# Patient Record
Sex: FEMALE | Employment: UNEMPLOYED | URBAN - METROPOLITAN AREA
[De-identification: names, ages, dates, MRNs, and addresses within clinical notes are randomized per-mention and may not be internally consistent; named-entity substitution may affect disease eponyms.]

---

## 2018-10-09 ENCOUNTER — HOSPITAL ENCOUNTER (OUTPATIENT)
Age: 68
Discharge: HOME | End: 2018-10-11

## 2018-10-09 PROCEDURE — 88305 TISSUE EXAM BY PATHOLOGIST: CPT

## 2018-10-09 PROCEDURE — 88342 IMHCHEM/IMCYTCHM 1ST ANTB: CPT

## 2020-12-24 ENCOUNTER — APPOINTMENT (OUTPATIENT)
Dept: CT IMAGING | Age: 70
End: 2020-12-24
Payer: MEDICARE

## 2020-12-24 ENCOUNTER — HOSPITAL ENCOUNTER (EMERGENCY)
Age: 70
Discharge: HOME | End: 2020-12-24
Attending: EMERGENCY MEDICINE
Payer: MEDICARE

## 2020-12-24 ENCOUNTER — APPOINTMENT (OUTPATIENT)
Dept: GENERAL RADIOLOGY | Age: 70
End: 2020-12-24
Payer: MEDICARE

## 2020-12-24 VITALS
SYSTOLIC BLOOD PRESSURE: 161 MMHG | DIASTOLIC BLOOD PRESSURE: 73 MMHG | OXYGEN SATURATION: 97 % | HEART RATE: 98 BPM | TEMPERATURE: 97.3 F | RESPIRATION RATE: 20 BRPM

## 2020-12-24 LAB
ALBUMIN SERPL-MCNC: 3.9 G/DL (ref 3.5–5.2)
ALP BLD-CCNC: 77 U/L (ref 35–104)
ALT SERPL-CCNC: 64 U/L (ref 0–32)
ANION GAP SERPL CALCULATED.3IONS-SCNC: 14 MMOL/L (ref 7–16)
APTT: 23.5 SEC (ref 24.5–35.1)
AST SERPL-CCNC: 78 U/L (ref 0–31)
BACTERIA: ABNORMAL /HPF
BASOPHILS ABSOLUTE: 0.02 E9/L (ref 0–0.2)
BASOPHILS RELATIVE PERCENT: 0.3 % (ref 0–2)
BILIRUB SERPL-MCNC: 0.2 MG/DL (ref 0–1.2)
BILIRUBIN URINE: NEGATIVE
BLOOD, URINE: NEGATIVE
BUN BLDV-MCNC: 11 MG/DL (ref 8–23)
CALCIUM SERPL-MCNC: 9.4 MG/DL (ref 8.6–10.2)
CHLORIDE BLD-SCNC: 95 MMOL/L (ref 98–107)
CHP ED QC CHECK: YES
CLARITY: CLEAR
CO2: 21 MMOL/L (ref 22–29)
COLOR: YELLOW
CREAT SERPL-MCNC: 1 MG/DL (ref 0.5–1)
EOSINOPHILS ABSOLUTE: 0.1 E9/L (ref 0.05–0.5)
EOSINOPHILS RELATIVE PERCENT: 1.4 % (ref 0–6)
GFR AFRICAN AMERICAN: >60
GFR NON-AFRICAN AMERICAN: 55 ML/MIN/1.73
GLUCOSE BLD-MCNC: 282 MG/DL
GLUCOSE BLD-MCNC: 288 MG/DL (ref 74–99)
GLUCOSE URINE: >=1000 MG/DL
HCT VFR BLD CALC: 33.6 % (ref 34–48)
HEMOGLOBIN: 10.9 G/DL (ref 11.5–15.5)
IMMATURE GRANULOCYTES #: 0.02 E9/L
IMMATURE GRANULOCYTES %: 0.3 % (ref 0–5)
INR BLD: 1.1
KETONES, URINE: 15 MG/DL
LEUKOCYTE ESTERASE, URINE: NEGATIVE
LYMPHOCYTES ABSOLUTE: 1.64 E9/L (ref 1.5–4)
LYMPHOCYTES RELATIVE PERCENT: 22.5 % (ref 20–42)
MCH RBC QN AUTO: 27.4 PG (ref 26–35)
MCHC RBC AUTO-ENTMCNC: 32.4 % (ref 32–34.5)
MCV RBC AUTO: 84.4 FL (ref 80–99.9)
METER GLUCOSE: 282 MG/DL (ref 74–99)
MONOCYTES ABSOLUTE: 0.52 E9/L (ref 0.1–0.95)
MONOCYTES RELATIVE PERCENT: 7.1 % (ref 2–12)
NEUTROPHILS ABSOLUTE: 5 E9/L (ref 1.8–7.3)
NEUTROPHILS RELATIVE PERCENT: 68.4 % (ref 43–80)
NITRITE, URINE: POSITIVE
PDW BLD-RTO: 13 FL (ref 11.5–15)
PH UA: 5.5 (ref 5–9)
PLATELET # BLD: 228 E9/L (ref 130–450)
PMV BLD AUTO: 10.4 FL (ref 7–12)
POTASSIUM SERPL-SCNC: 4.3 MMOL/L (ref 3.5–5)
PROTEIN UA: 30 MG/DL
PROTHROMBIN TIME: 12.8 SEC (ref 9.3–12.4)
RBC # BLD: 3.98 E12/L (ref 3.5–5.5)
RBC UA: ABNORMAL /HPF (ref 0–2)
SODIUM BLD-SCNC: 130 MMOL/L (ref 132–146)
SPECIFIC GRAVITY UA: 1.02 (ref 1–1.03)
TOTAL PROTEIN: 7.3 G/DL (ref 6.4–8.3)
TROPONIN: <0.01 NG/ML (ref 0–0.03)
UROBILINOGEN, URINE: 0.2 E.U./DL
WBC # BLD: 7.3 E9/L (ref 4.5–11.5)
WBC UA: ABNORMAL /HPF (ref 0–5)

## 2020-12-24 PROCEDURE — 85025 COMPLETE CBC W/AUTO DIFF WBC: CPT

## 2020-12-24 PROCEDURE — 82962 GLUCOSE BLOOD TEST: CPT

## 2020-12-24 PROCEDURE — 85730 THROMBOPLASTIN TIME PARTIAL: CPT

## 2020-12-24 PROCEDURE — 99284 EMERGENCY DEPT VISIT MOD MDM: CPT

## 2020-12-24 PROCEDURE — 2580000003 HC RX 258: Performed by: EMERGENCY MEDICINE

## 2020-12-24 PROCEDURE — 81001 URINALYSIS AUTO W/SCOPE: CPT

## 2020-12-24 PROCEDURE — 80053 COMPREHEN METABOLIC PANEL: CPT

## 2020-12-24 PROCEDURE — 85610 PROTHROMBIN TIME: CPT

## 2020-12-24 PROCEDURE — 93005 ELECTROCARDIOGRAM TRACING: CPT | Performed by: EMERGENCY MEDICINE

## 2020-12-24 PROCEDURE — 71045 X-RAY EXAM CHEST 1 VIEW: CPT

## 2020-12-24 PROCEDURE — 70450 CT HEAD/BRAIN W/O DYE: CPT

## 2020-12-24 PROCEDURE — 84484 ASSAY OF TROPONIN QUANT: CPT

## 2020-12-24 RX ORDER — SODIUM CHLORIDE 9 MG/ML
INJECTION, SOLUTION INTRAVENOUS CONTINUOUS
Status: DISCONTINUED | OUTPATIENT
Start: 2020-12-24 | End: 2020-12-25 | Stop reason: HOSPADM

## 2020-12-24 RX ORDER — MECLIZINE HYDROCHLORIDE 25 MG/1
25 TABLET ORAL EVERY 8 HOURS PRN
COMMUNITY

## 2020-12-24 RX ADMIN — SODIUM CHLORIDE: 9 INJECTION, SOLUTION INTRAVENOUS at 18:24

## 2020-12-24 NOTE — ED NOTES
Bed: 09  Expected date:   Expected time:   Means of arrival:   Comments:  Adi Mata RN  12/24/20 3294

## 2020-12-25 LAB
EKG ATRIAL RATE: 109 BPM
EKG P AXIS: 81 DEGREES
EKG P-R INTERVAL: 190 MS
EKG Q-T INTERVAL: 340 MS
EKG QRS DURATION: 76 MS
EKG QTC CALCULATION (BAZETT): 457 MS
EKG R AXIS: 95 DEGREES
EKG T AXIS: 81 DEGREES
EKG VENTRICULAR RATE: 109 BPM

## 2020-12-25 PROCEDURE — 93010 ELECTROCARDIOGRAM REPORT: CPT | Performed by: INTERNAL MEDICINE
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
## 2021-02-20 ENCOUNTER — IMMUNIZATION (OUTPATIENT)
Dept: PRIMARY CARE CLINIC | Age: 71
End: 2021-02-20
Payer: MEDICARE

## 2021-02-20 PROCEDURE — 91300 COVID-19, PFIZER VACCINE 30MCG/0.3ML DOSE: CPT | Performed by: NURSE PRACTITIONER

## 2021-02-20 PROCEDURE — 0001A COVID-19, PFIZER VACCINE 30MCG/0.3ML DOSE: CPT | Performed by: NURSE PRACTITIONER

## 2021-03-11 ENCOUNTER — IMMUNIZATION (OUTPATIENT)
Dept: PRIMARY CARE CLINIC | Age: 71
End: 2021-03-11
Payer: MEDICARE

## 2021-03-11 PROCEDURE — 91300 COVID-19, PFIZER VACCINE 30MCG/0.3ML DOSE: CPT | Performed by: NURSE PRACTITIONER

## 2021-03-11 PROCEDURE — 0002A COVID-19, PFIZER VACCINE 30MCG/0.3ML DOSE: CPT | Performed by: NURSE PRACTITIONER

## 2021-06-29 ENCOUNTER — APPOINTMENT (OUTPATIENT)
Dept: GENERAL RADIOLOGY | Age: 71
End: 2021-06-29
Payer: MEDICARE

## 2021-06-29 ENCOUNTER — APPOINTMENT (OUTPATIENT)
Dept: CT IMAGING | Age: 71
End: 2021-06-29
Payer: MEDICARE

## 2021-06-29 ENCOUNTER — HOSPITAL ENCOUNTER (OUTPATIENT)
Age: 71
Setting detail: OBSERVATION
Discharge: HOME | End: 2021-06-30
Attending: EMERGENCY MEDICINE | Admitting: INTERNAL MEDICINE
Payer: MEDICARE

## 2021-06-29 DIAGNOSIS — I10 ESSENTIAL HYPERTENSION: ICD-10-CM

## 2021-06-29 DIAGNOSIS — R27.0 ATAXIA: ICD-10-CM

## 2021-06-29 DIAGNOSIS — R29.90 STROKE-LIKE SYMPTOM: Primary | ICD-10-CM

## 2021-06-29 LAB
ALBUMIN SERPL-MCNC: 4.4 G/DL (ref 3.5–5.2)
ALP BLD-CCNC: 83 U/L (ref 35–104)
ALT SERPL-CCNC: 49 U/L (ref 0–32)
ANION GAP SERPL CALCULATED.3IONS-SCNC: 12 MMOL/L (ref 7–16)
APTT: 34.5 SEC (ref 24.5–35.1)
AST SERPL-CCNC: 60 U/L (ref 0–31)
BASOPHILS ABSOLUTE: 0.03 E9/L (ref 0–0.2)
BASOPHILS RELATIVE PERCENT: 0.4 % (ref 0–2)
BILIRUB SERPL-MCNC: 0.3 MG/DL (ref 0–1.2)
BILIRUBIN URINE: NEGATIVE
BLOOD, URINE: NEGATIVE
BUN BLDV-MCNC: 8 MG/DL (ref 6–23)
CALCIUM SERPL-MCNC: 10 MG/DL (ref 8.6–10.2)
CHLORIDE BLD-SCNC: 97 MMOL/L (ref 98–107)
CHP ED QC CHECK: NORMAL
CLARITY: CLEAR
CO2: 26 MMOL/L (ref 22–29)
COLOR: YELLOW
CREAT SERPL-MCNC: 0.5 MG/DL (ref 0.5–1)
EOSINOPHILS ABSOLUTE: 0.1 E9/L (ref 0.05–0.5)
EOSINOPHILS RELATIVE PERCENT: 1.2 % (ref 0–6)
GFR AFRICAN AMERICAN: >60
GFR NON-AFRICAN AMERICAN: >60 ML/MIN/1.73
GLUCOSE BLD-MCNC: 100 MG/DL
GLUCOSE BLD-MCNC: 104 MG/DL (ref 74–99)
GLUCOSE URINE: NEGATIVE MG/DL
HCT VFR BLD CALC: 33.1 % (ref 34–48)
HEMOGLOBIN: 10.3 G/DL (ref 11.5–15.5)
IMMATURE GRANULOCYTES #: 0.03 E9/L
IMMATURE GRANULOCYTES %: 0.4 % (ref 0–5)
INR BLD: 1.1
KETONES, URINE: NEGATIVE MG/DL
LEUKOCYTE ESTERASE, URINE: NEGATIVE
LYMPHOCYTES ABSOLUTE: 2.56 E9/L (ref 1.5–4)
LYMPHOCYTES RELATIVE PERCENT: 31.3 % (ref 20–42)
MCH RBC QN AUTO: 24.3 PG (ref 26–35)
MCHC RBC AUTO-ENTMCNC: 31.1 % (ref 32–34.5)
MCV RBC AUTO: 78.1 FL (ref 80–99.9)
METER GLUCOSE: 100 MG/DL (ref 74–99)
METER GLUCOSE: 119 MG/DL (ref 74–99)
METER GLUCOSE: 244 MG/DL (ref 74–99)
MONOCYTES ABSOLUTE: 0.55 E9/L (ref 0.1–0.95)
MONOCYTES RELATIVE PERCENT: 6.7 % (ref 2–12)
NEUTROPHILS ABSOLUTE: 4.92 E9/L (ref 1.8–7.3)
NEUTROPHILS RELATIVE PERCENT: 60 % (ref 43–80)
NITRITE, URINE: NEGATIVE
PDW BLD-RTO: 14.9 FL (ref 11.5–15)
PH UA: 7.5 (ref 5–9)
PLATELET # BLD: 342 E9/L (ref 130–450)
PMV BLD AUTO: 10.4 FL (ref 7–12)
POTASSIUM REFLEX MAGNESIUM: 4.2 MMOL/L (ref 3.5–5)
PROTEIN UA: NEGATIVE MG/DL
PROTHROMBIN TIME: 11.4 SEC (ref 9.3–12.4)
RBC # BLD: 4.24 E12/L (ref 3.5–5.5)
SODIUM BLD-SCNC: 135 MMOL/L (ref 132–146)
SPECIFIC GRAVITY UA: <=1.005 (ref 1–1.03)
TOTAL PROTEIN: 8.6 G/DL (ref 6.4–8.3)
TROPONIN, HIGH SENSITIVITY: <6 NG/L (ref 0–9)
UROBILINOGEN, URINE: 0.2 E.U./DL
WBC # BLD: 8.2 E9/L (ref 4.5–11.5)

## 2021-06-29 PROCEDURE — 6360000002 HC RX W HCPCS: Performed by: EMERGENCY MEDICINE

## 2021-06-29 PROCEDURE — 70496 CT ANGIOGRAPHY HEAD: CPT

## 2021-06-29 PROCEDURE — 96372 THER/PROPH/DIAG INJ SC/IM: CPT

## 2021-06-29 PROCEDURE — 80053 COMPREHEN METABOLIC PANEL: CPT

## 2021-06-29 PROCEDURE — G0378 HOSPITAL OBSERVATION PER HR: HCPCS

## 2021-06-29 PROCEDURE — 6360000002 HC RX W HCPCS: Performed by: INTERNAL MEDICINE

## 2021-06-29 PROCEDURE — 2580000003 HC RX 258: Performed by: INTERNAL MEDICINE

## 2021-06-29 PROCEDURE — 96374 THER/PROPH/DIAG INJ IV PUSH: CPT

## 2021-06-29 PROCEDURE — 93005 ELECTROCARDIOGRAM TRACING: CPT | Performed by: EMERGENCY MEDICINE

## 2021-06-29 PROCEDURE — 82962 GLUCOSE BLOOD TEST: CPT

## 2021-06-29 PROCEDURE — 70498 CT ANGIOGRAPHY NECK: CPT

## 2021-06-29 PROCEDURE — 71045 X-RAY EXAM CHEST 1 VIEW: CPT

## 2021-06-29 PROCEDURE — 0042T CT BRAIN PERFUSION: CPT

## 2021-06-29 PROCEDURE — 84484 ASSAY OF TROPONIN QUANT: CPT

## 2021-06-29 PROCEDURE — 70450 CT HEAD/BRAIN W/O DYE: CPT

## 2021-06-29 PROCEDURE — 85025 COMPLETE CBC W/AUTO DIFF WBC: CPT

## 2021-06-29 PROCEDURE — 99284 EMERGENCY DEPT VISIT MOD MDM: CPT

## 2021-06-29 PROCEDURE — 6370000000 HC RX 637 (ALT 250 FOR IP): Performed by: INTERNAL MEDICINE

## 2021-06-29 PROCEDURE — 6360000004 HC RX CONTRAST MEDICATION: Performed by: RADIOLOGY

## 2021-06-29 PROCEDURE — 85730 THROMBOPLASTIN TIME PARTIAL: CPT

## 2021-06-29 PROCEDURE — 85610 PROTHROMBIN TIME: CPT

## 2021-06-29 PROCEDURE — 6370000000 HC RX 637 (ALT 250 FOR IP): Performed by: EMERGENCY MEDICINE

## 2021-06-29 PROCEDURE — 2580000003 HC RX 258: Performed by: EMERGENCY MEDICINE

## 2021-06-29 PROCEDURE — 81003 URINALYSIS AUTO W/O SCOPE: CPT

## 2021-06-29 RX ORDER — ASPIRIN 81 MG/1
81 TABLET ORAL DAILY
COMMUNITY

## 2021-06-29 RX ORDER — MECLIZINE HCL 12.5 MG/1
25 TABLET ORAL ONCE
Status: COMPLETED | OUTPATIENT
Start: 2021-06-29 | End: 2021-06-29

## 2021-06-29 RX ORDER — SODIUM CHLORIDE 9 MG/ML
25 INJECTION, SOLUTION INTRAVENOUS PRN
Status: DISCONTINUED | OUTPATIENT
Start: 2021-06-29 | End: 2021-06-30 | Stop reason: HOSPADM

## 2021-06-29 RX ORDER — 0.9 % SODIUM CHLORIDE 0.9 %
1000 INTRAVENOUS SOLUTION INTRAVENOUS ONCE
Status: COMPLETED | OUTPATIENT
Start: 2021-06-29 | End: 2021-06-29

## 2021-06-29 RX ORDER — SODIUM CHLORIDE 0.9 % (FLUSH) 0.9 %
5-40 SYRINGE (ML) INJECTION PRN
Status: DISCONTINUED | OUTPATIENT
Start: 2021-06-29 | End: 2021-06-30 | Stop reason: HOSPADM

## 2021-06-29 RX ORDER — ASPIRIN 300 MG/1
300 SUPPOSITORY RECTAL DAILY
Status: DISCONTINUED | OUTPATIENT
Start: 2021-06-29 | End: 2021-06-30 | Stop reason: HOSPADM

## 2021-06-29 RX ORDER — DEXTROSE MONOHYDRATE 25 G/50ML
12.5 INJECTION, SOLUTION INTRAVENOUS PRN
Status: DISCONTINUED | OUTPATIENT
Start: 2021-06-29 | End: 2021-06-30 | Stop reason: HOSPADM

## 2021-06-29 RX ORDER — OMEPRAZOLE 20 MG/1
20 CAPSULE, DELAYED RELEASE ORAL
COMMUNITY

## 2021-06-29 RX ORDER — NICOTINE POLACRILEX 4 MG
15 LOZENGE BUCCAL PRN
Status: DISCONTINUED | OUTPATIENT
Start: 2021-06-29 | End: 2021-06-30 | Stop reason: HOSPADM

## 2021-06-29 RX ORDER — POLYETHYLENE GLYCOL 3350 17 G/17G
17 POWDER, FOR SOLUTION ORAL DAILY PRN
Status: DISCONTINUED | OUTPATIENT
Start: 2021-06-29 | End: 2021-06-30 | Stop reason: HOSPADM

## 2021-06-29 RX ORDER — ASPIRIN 81 MG/1
81 TABLET ORAL DAILY
Status: DISCONTINUED | OUTPATIENT
Start: 2021-06-29 | End: 2021-06-30 | Stop reason: HOSPADM

## 2021-06-29 RX ORDER — SODIUM CHLORIDE 9 MG/ML
INJECTION, SOLUTION INTRAVENOUS CONTINUOUS
Status: DISCONTINUED | OUTPATIENT
Start: 2021-06-29 | End: 2021-06-30 | Stop reason: HOSPADM

## 2021-06-29 RX ORDER — DEXTROSE MONOHYDRATE 50 MG/ML
100 INJECTION, SOLUTION INTRAVENOUS PRN
Status: DISCONTINUED | OUTPATIENT
Start: 2021-06-29 | End: 2021-06-30 | Stop reason: HOSPADM

## 2021-06-29 RX ORDER — SODIUM CHLORIDE 0.9 % (FLUSH) 0.9 %
5-40 SYRINGE (ML) INJECTION EVERY 12 HOURS SCHEDULED
Status: DISCONTINUED | OUTPATIENT
Start: 2021-06-29 | End: 2021-06-30 | Stop reason: HOSPADM

## 2021-06-29 RX ORDER — ONDANSETRON 2 MG/ML
4 INJECTION INTRAMUSCULAR; INTRAVENOUS EVERY 6 HOURS PRN
Status: DISCONTINUED | OUTPATIENT
Start: 2021-06-29 | End: 2021-06-30 | Stop reason: HOSPADM

## 2021-06-29 RX ORDER — CETIRIZINE HYDROCHLORIDE 10 MG/1
10 TABLET ORAL DAILY PRN
COMMUNITY

## 2021-06-29 RX ORDER — ATORVASTATIN CALCIUM 40 MG/1
40 TABLET, FILM COATED ORAL NIGHTLY
Status: DISCONTINUED | OUTPATIENT
Start: 2021-06-29 | End: 2021-06-30 | Stop reason: HOSPADM

## 2021-06-29 RX ORDER — CALCIUM CARBONATE 500(1250)
500 TABLET ORAL DAILY
COMMUNITY

## 2021-06-29 RX ORDER — ONDANSETRON 4 MG/1
4 TABLET, ORALLY DISINTEGRATING ORAL EVERY 8 HOURS PRN
Status: DISCONTINUED | OUTPATIENT
Start: 2021-06-29 | End: 2021-06-30 | Stop reason: HOSPADM

## 2021-06-29 RX ORDER — ASPIRIN 81 MG/1
243 TABLET, CHEWABLE ORAL ONCE
Status: COMPLETED | OUTPATIENT
Start: 2021-06-29 | End: 2021-06-29

## 2021-06-29 RX ORDER — ONDANSETRON 2 MG/ML
4 INJECTION INTRAMUSCULAR; INTRAVENOUS ONCE
Status: COMPLETED | OUTPATIENT
Start: 2021-06-29 | End: 2021-06-29

## 2021-06-29 RX ORDER — PANTOPRAZOLE SODIUM 40 MG/1
40 TABLET, DELAYED RELEASE ORAL
Status: DISCONTINUED | OUTPATIENT
Start: 2021-06-30 | End: 2021-06-30 | Stop reason: HOSPADM

## 2021-06-29 RX ORDER — CALCIUM CARBONATE 500(1250)
500 TABLET ORAL DAILY
Status: DISCONTINUED | OUTPATIENT
Start: 2021-06-29 | End: 2021-06-30 | Stop reason: HOSPADM

## 2021-06-29 RX ADMIN — ATORVASTATIN CALCIUM 40 MG: 40 TABLET, FILM COATED ORAL at 19:50

## 2021-06-29 RX ADMIN — ENOXAPARIN SODIUM 40 MG: 40 INJECTION SUBCUTANEOUS at 18:15

## 2021-06-29 RX ADMIN — SODIUM CHLORIDE, PRESERVATIVE FREE 10 ML: 5 INJECTION INTRAVENOUS at 21:41

## 2021-06-29 RX ADMIN — IOPAMIDOL 100 ML: 755 INJECTION, SOLUTION INTRAVENOUS at 14:27

## 2021-06-29 RX ADMIN — SODIUM CHLORIDE: 9 INJECTION, SOLUTION INTRAVENOUS at 18:13

## 2021-06-29 RX ADMIN — ASPIRIN 243 MG: 81 TABLET, CHEWABLE ORAL at 16:05

## 2021-06-29 RX ADMIN — ONDANSETRON 4 MG: 2 INJECTION INTRAMUSCULAR; INTRAVENOUS at 14:43

## 2021-06-29 RX ADMIN — SODIUM CHLORIDE, PRESERVATIVE FREE 10 ML: 5 INJECTION INTRAVENOUS at 19:50

## 2021-06-29 RX ADMIN — SODIUM CHLORIDE 1000 ML: 9 INJECTION, SOLUTION INTRAVENOUS at 15:28

## 2021-06-29 RX ADMIN — ASPIRIN 81 MG: 81 TABLET, COATED ORAL at 18:13

## 2021-06-29 RX ADMIN — MECLIZINE HCL 12.5 MG 25 MG: 12.5 TABLET ORAL at 15:27

## 2021-06-29 RX ADMIN — INSULIN LISPRO 1 UNITS: 100 INJECTION, SOLUTION INTRAVENOUS; SUBCUTANEOUS at 19:50

## 2021-06-29 ASSESSMENT — PAIN SCALES - GENERAL
PAINLEVEL_OUTOF10: 0
PAINLEVEL_OUTOF10: 6
PAINLEVEL_OUTOF10: 5

## 2021-06-29 ASSESSMENT — PAIN DESCRIPTION - DESCRIPTORS: DESCRIPTORS: SHARP

## 2021-06-29 ASSESSMENT — ENCOUNTER SYMPTOMS
SHORTNESS OF BREATH: 0
DIARRHEA: 0
BACK PAIN: 0
VOMITING: 0
EYE PAIN: 0
SORE THROAT: 0
COUGH: 0
NAUSEA: 1
ABDOMINAL PAIN: 1

## 2021-06-29 ASSESSMENT — PAIN DESCRIPTION - PAIN TYPE: TYPE: ACUTE PAIN

## 2021-06-29 NOTE — ED NOTES
Bed: 20  Expected date:   Expected time:   Means of arrival:   Comments:  Theodore Rodriguez, [facility]  06/29/21 3444

## 2021-06-29 NOTE — ED NOTES
[facility] faxed. Cortney at ext [phone] confirmed receipt.  Patient prepared to be transported via cart and monitor     Indy Flanagan RN  06/29/21 [id number]

## 2021-06-29 NOTE — ED PROVIDER NOTES
HPI   Patient is a 69 y.o. female with a past medical history of diabetes, seizures, presenting to the Emergency Department for arm pain, leg pain and dizziness. History obtained by patient. Symptoms are moderate to severe in severity and persistent since onset. They are improved by nothing and worsened by movement. Patient states yesterday she started with sharp stabbing arm pain that was her entire right upper extremity. She went to bed to get better. She woke up today the arm pain had improved but was still there. She states other than the arm pain she was her normal self. Around 8 AM she was cooking in the kitchen had sudden onset of nausea as well as lightheadedness. She states she felt very diaphoretic and thought she was going to pass out. She also felt like her balance was very off. Nothing like this is ever happened before. She called her daughter and she came over to evaluate her and patient states she had to come to the ER for evaluation. Denies any history of stroke. No recent trauma. Denies blurry vision, changes in vision, chest pain, shortness of breath. States she does have chronic abdominal pain is not new or worsening. Denies diarrhea or constipation she has no numbness or tingling. She denies any weakness. She does states she has chronic left lower extremity pain that has been ongoing for many months. She denies any bowel or bladder incontinence. No saddle paresthesia    Review of Systems   Constitutional: Positive for diaphoresis. Negative for chills and fever. HENT: Negative for congestion and sore throat. Eyes: Negative for pain and visual disturbance. Respiratory: Negative for cough and shortness of breath. Cardiovascular: Negative for chest pain. Gastrointestinal: Positive for abdominal pain and nausea. Negative for diarrhea and vomiting. Genitourinary: Negative for dysuria and frequency. Musculoskeletal: Negative for arthralgias and back pain.    Skin: Negative for rash and wound. Neurological: Positive for dizziness and light-headedness. Negative for weakness, numbness and headaches. All other systems reviewed and are negative. Physical Exam  Vitals and nursing note reviewed. Constitutional:       General: She is not in acute distress. Appearance: She is well-developed. She is not ill-appearing. HENT:      Head: Normocephalic and atraumatic. Right Ear: Tympanic membrane normal.      Ears:      Comments: Left tm old perf, no evidence of infection  Eyes:      Extraocular Movements: Extraocular movements intact. Pupils: Pupils are equal, round, and reactive to light. Cardiovascular:      Rate and Rhythm: Regular rhythm. Tachycardia present. Pulses: Normal pulses. Pulmonary:      Effort: Pulmonary effort is normal. No respiratory distress. Breath sounds: Normal breath sounds. No wheezing or rales. Abdominal:      Palpations: Abdomen is soft. Tenderness: There is no abdominal tenderness. There is no guarding or rebound. Musculoskeletal:      Cervical back: Normal range of motion and neck supple. No tenderness. Skin:     General: Skin is warm and dry. Capillary Refill: Capillary refill takes less than 2 seconds. Neurological:      Mental Status: She is alert and oriented to person, place, and time. Cranial Nerves: No cranial nerve deficit. Coordination: Coordination abnormal.      Gait: Gait abnormal.      Comments: NIH stroke scale of 4. Left-sided ataxia. Positive Romberg. Ataxic with ambulation. Left-sided pronator drift        NIH Stroke Scale/Score at time of initial evaluation:  1A: Level of Consciousness 0 - alert; keenly responsive   1B: Ask Month and Age 0 - answers both questions correctly   1C:  Tell Patient To Open and Close Eyes, then Hand  Squeeze 0 - performs both tasks correctly   2: Test Horizontal Extraocular Movements 0 - normal   3: Test Visual Fields 0 - no visual loss 4: Test Facial Palsy 0 - normal symmetric movement   5A: Test Left Arm Motor Drift 1 - drift, limb holds 90 (or 45) degrees but drifts down before full 10 seconds: does not hit bed   5B: Test Right Arm Motor Drift 0 - no drift, limb holds 90 (or 45) degrees for full 10 seconds   6A: Test Left Leg Motor Drift 1 - drift; leg falls by the end of the 5 second period but does not hit bed   6B: Test Right Leg Motor Drift 0 - no drift; leg holds 30 degree position for full 5 seconds   7: Test Limb Ataxia   (FNF/Heel-Shin) 2 - present in two limbs   8: Test Sensation 0 - normal; no sensory loss   9: Test Language/Aphasia 0 - no aphasia, normal   10: Test Dysarthria 0 - normal   11: Test Extinction/Inattention 0 - no abnormality   Total Score: 4   6/29/21 at 210pm.      Acute CVA Core Measures:      - t-PA Eligibility: IV t-PA was considered and not given due to violations in inclusion criteria including stroke onset was greater than 3 hours prior to presentation        Procedures     MDM   Patient presented to the Emergency Department for arm pain, leg pain, dizziness. They are clinically stable, hypertensive. Patient does have a left-sided ataxia and is ataxic with her gait. Concern for possible stroke. Last known well 8 AM, TPA considered but of the window for TPA. Abdominal alert was called. Patient's blood pressure improved on its own. Will allow permissive HTN for possible ischmeic stroke. Imaging obtained and essentially unremarkable. No evidence of large vessel occlusion, no thrombectomy candidate. Rest of work-up obtained. EKG with no acute ischemic changes, there is a prolonged QT which is identified after giving the patient meclizine. There is a mild transaminitis which is actually improved from baseline. No identifiable source of patient's symptoms at this time. 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 No evidence of infection.   Concern for strokelike symptoms. Patient benefit for inpatient evaluation and care. Consult was placed on-call hospitalist who accept the patient for admission. ED Course as of Jun 29 1611   Tue Jun 29, 2021   1413 NIH stroke scale of 4. LKW 8am    []      ED Course User Index  [] Stephanie Florence DO            ----------------------------------------------- PAST HISTORY --------------------------------------------  Past Medical History:  has a past medical history of Diabetes mellitus ([facility] [address] [facility] Past Surgical History:  has a past surgical history that includes Hysterectomy; Cholecystectomy; Tonsillectomy; and Cosmetic surgery. Social History:  reports that she has never smoked. She has never used smokeless tobacco. She reports that she does not drink alcohol and does not use drugs. Family History: family history is not on file. The patients home medications have been reviewed.     Allergies: Meperidine hcl    ------------------------------------------------ RESULTS ---------------------------------------------------    LABS:  Results for orders placed or performed during the hospital encounter of 06/29/21   CBC Auto Differential   Result Value Ref Range    WBC 8.2 4.5 - 11.5 E9/L    RBC 4.24 3.50 - 5.50 E12/L    Hemoglobin 10.3 (L) 11.5 - 15.5 g/dL    Hematocrit 33.1 (L) 34.0 - 48.0 %    MCV 78.1 (L) 80.0 - 99.9 fL    MCH 24.3 (L) 26.0 - 35.0 pg    MCHC 31.1 (L) 32.0 - 34.5 %    RDW 14.9 11.5 - 15.0 fL    Platelets 108 709 - 617 E9/L    MPV 10.4 7.0 - 12.0 fL    Neutrophils % 60.0 43.0 - 80.0 %    Immature Granulocytes % 0.4 0.0 - 5.0 %    Lymphocytes % 31.3 20.0 - 42.0 %    Monocytes % 6.7 2.0 - 12.0 %    Eosinophils % 1.2 0.0 - 6.0 %    Basophils % 0.4 0.0 - 2.0 %    Neutrophils Absolute 4.92 1.80 - 7.30 E9/L    Immature Granulocytes # 0.03 E9/L    Lymphocytes Absolute 2.56 1.50 - 4.00 E9/L    Monocytes Absolute 0.55 0.10 - 0.95 E9/L    Eosinophils Absolute 0.10 0.05 - 0.50 E9/L Basophils Absolute 0.03 0.00 - 0.20 E9/L   Troponin   Result Value Ref Range    Troponin, High Sensitivity <6 0 - 9 ng/L   Protime-INR   Result Value Ref Range    Protime 11.4 9.3 - 12.4 sec    INR 1.1    APTT   Result Value Ref Range    aPTT 34.5 24.5 - 35.1 sec   Comprehensive Metabolic Panel w/ Reflex to MG   Result Value Ref Range    Sodium 135 132 - 146 mmol/L    Potassium reflex Magnesium 4.2 3.5 - 5.0 mmol/L    Chloride 97 (L) 98 - 107 mmol/L    CO2 26 22 - 29 mmol/L    Anion Gap 12 7 - 16 mmol/L    Glucose 104 (H) 74 - 99 mg/dL    BUN 8 6 - 23 mg/dL    CREATININE 0.5 0.5 - 1.0 mg/dL    GFR Non-African American >60 >=60 mL/min/1.73    GFR African American >60     Calcium 10.0 8.6 - 10.2 mg/dL    Total Protein 8.6 (H) 6.4 - 8.3 g/dL    Albumin 4.4 3.5 - 5.2 g/dL    Total Bilirubin 0.3 0.0 - 1.2 mg/dL    Alkaline Phosphatase 83 35 - 104 U/L    ALT 49 (H) 0 - 32 U/L    AST 60 (H) 0 - 31 U/L   Urinalysis, reflex to microscopic   Result Value Ref Range    Color, UA Yellow Straw/Yellow    Clarity, UA Clear Clear    Glucose, Ur Negative Negative mg/dL    Bilirubin Urine Negative Negative    Ketones, Urine Negative Negative mg/dL    Specific Gravity, UA <=1.005 1.005 - 1.030    Blood, Urine Negative Negative    pH, UA 7.5 5.0 - 9.0    Protein, UA Negative Negative mg/dL    Urobilinogen, Urine 0.2 <2.0 E.U./dL    Nitrite, Urine Negative Negative    Leukocyte Esterase, Urine Negative Negative   POCT Glucose   Result Value Ref Range    Glucose 100 mg/dL    QC OK? ok    POCT Glucose   Result Value Ref Range    Meter Glucose 100 (H) 74 - 99 mg/dL   EKG 12 Lead   Result Value Ref Range    Ventricular Rate 84 BPM    Atrial Rate 84 BPM    P-R Interval 202 ms    QRS Duration 76 ms    Q-T Interval 416 ms    QTc Calculation (Bazett) 491 ms    P Axis 55 degrees    R Axis 37 degrees    T Axis 58 degrees       RADIOLOGY:    All Radiology results interpreted by Radiologist unless otherwise noted.   XR CHEST PORTABLE   Final Result No acute process. CTA HEAD W CONTRAST   Preliminary Result   No large vessel occlusion in the head or neck. No perfusion mismatch. CTA NECK W CONTRAST   Preliminary Result   No large vessel occlusion in the head or neck. No perfusion mismatch. CT BRAIN PERFUSION   Preliminary Result   No large vessel occlusion in the head or neck. No perfusion mismatch. CT HEAD WO CONTRAST   Final Result   No acute intracranial abnormality. Results were called by Dr. Jerrell Salvador to Dr. Venus Barfield on 6/29/2021 at 14:46. EKG:  This EKG is signed and interpreted by ED Physician. Time:  1542   Rate: 84  Rhythm: Sinus. Interpretation: no acute changes. Normal axis deviation. No acute ST segment elevation. QTc 491. QRS 76  Comparison: changes compared to previous EKG.    ---------------------------- NURSING NOTES AND VITALS REVIEWED -------------------------   The nursing notes within the ED encounter and vital signs as below have been reviewed. BP (!) 159/69   Pulse 86   Temp 98.6 °F (37 °C)   Resp 14   Ht 4' 11\" (1.499 m)   Wt 120 lb (54.4 kg)   SpO2 98%   BMI 24.24 kg/m²   Oxygen Saturation Interpretation: Normal      ------------------------------------------PROGRESS NOTES -------------------------------------------    ED COURSE MEDICATIONS:                Medications   0.9 % sodium chloride bolus (1,000 mLs Intravenous New Bag 6/29/21 1528)   iopamidol (ISOVUE-370) 76 % injection 100 mL (100 mLs Intravenous Given 6/29/21 1427)   ondansetron (ZOFRAN) injection 4 mg (4 mg Intravenous Given 6/29/21 1443)   meclizine (ANTIVERT) tablet 25 mg (25 mg Oral Given 6/29/21 1527)   aspirin chewable tablet 243 mg (243 mg Oral Given 6/29/21 1605)       CONSULTATIONS:            Consultation:  I Spoke with Dr. Gale Herrmann (Medicine). Discussed case. They will admit this patient. Gunner Richard PROCEDURES:            none.       COUNSELING:   I have spoken with the daughter and patient and discussed todays results, in addition to providing specific details for the plan of care and counseling regarding the diagnosis and prognosis.     --------------------------------------- IMPRESSION & DISPOSITION --------------------------------     IMPRESSION(s):  1. Stroke-like symptom    2. Ataxia    3. Essential hypertension        This patient's ED course included: a personal history and physicial examination, re-evaluation prior to disposition, cardiac monitoring and continuous pulse oximetry    This patient has been closely monitored during their ED course. DISPOSITION:  Disposition: Admit to telemetry. Patient condition is stable. END OF PROVIDER NOTE.             Isabella Choi DO  Resident  06/29/21 [id number]

## 2021-06-29 NOTE — PROGRESS NOTES
Notified Dr Jamison Montoya via telephone that pt has arrived to unit and that we need admission orders.

## 2021-06-29 NOTE — ED NOTES
Assumed care of patient. Introduced self to patient as RN. Patient complains of dizziness. Family at bedside call light within reach.      Cydney Kay RN  06/29/21 [id number]

## 2021-06-30 ENCOUNTER — APPOINTMENT (OUTPATIENT)
Dept: MRI IMAGING | Age: 71
End: 2021-06-30
Payer: MEDICARE

## 2021-06-30 VITALS
HEART RATE: 71 BPM | RESPIRATION RATE: 16 BRPM | OXYGEN SATURATION: 95 % | DIASTOLIC BLOOD PRESSURE: 60 MMHG | WEIGHT: 120 LBS | SYSTOLIC BLOOD PRESSURE: 125 MMHG | BODY MASS INDEX: 24.19 KG/M2 | TEMPERATURE: 98 F | HEIGHT: 59 IN

## 2021-06-30 PROBLEM — K21.9 GERD (GASTROESOPHAGEAL REFLUX DISEASE): Status: ACTIVE | Noted: 2021-06-30

## 2021-06-30 PROBLEM — R42 VERTIGO: Status: ACTIVE | Noted: 2021-06-29

## 2021-06-30 PROBLEM — R03.0 TRANSIENT ELEVATED BLOOD PRESSURE: Status: ACTIVE | Noted: 2021-06-30

## 2021-06-30 PROBLEM — E11.9 DM (DIABETES MELLITUS), TYPE 2 (HCC): Status: ACTIVE | Noted: 2021-06-30

## 2021-06-30 PROBLEM — H81.10 BPPV (BENIGN PAROXYSMAL POSITIONAL VERTIGO): Status: ACTIVE | Noted: 2021-06-30

## 2021-06-30 LAB
ANION GAP SERPL CALCULATED.3IONS-SCNC: 7 MMOL/L (ref 7–16)
BUN BLDV-MCNC: 9 MG/DL (ref 6–23)
CALCIUM SERPL-MCNC: 9.1 MG/DL (ref 8.6–10.2)
CHLORIDE BLD-SCNC: 102 MMOL/L (ref 98–107)
CHOLESTEROL, TOTAL: 148 MG/DL (ref 0–199)
CO2: 27 MMOL/L (ref 22–29)
CREAT SERPL-MCNC: 0.5 MG/DL (ref 0.5–1)
EKG ATRIAL RATE: 84 BPM
EKG P AXIS: 55 DEGREES
EKG P-R INTERVAL: 202 MS
EKG Q-T INTERVAL: 416 MS
EKG QRS DURATION: 76 MS
EKG QTC CALCULATION (BAZETT): 491 MS
EKG R AXIS: 37 DEGREES
EKG T AXIS: 58 DEGREES
EKG VENTRICULAR RATE: 84 BPM
GFR AFRICAN AMERICAN: >60
GFR NON-AFRICAN AMERICAN: >60 ML/MIN/1.73
GLUCOSE BLD-MCNC: 152 MG/DL (ref 74–99)
HBA1C MFR BLD: 7.4 % (ref 4–5.6)
HCT VFR BLD CALC: 29.8 % (ref 34–48)
HDLC SERPL-MCNC: 32 MG/DL
HEMOGLOBIN: 9.2 G/DL (ref 11.5–15.5)
LDL CHOLESTEROL CALCULATED: 94 MG/DL (ref 0–99)
MCH RBC QN AUTO: 24.7 PG (ref 26–35)
MCHC RBC AUTO-ENTMCNC: 30.9 % (ref 32–34.5)
MCV RBC AUTO: 79.9 FL (ref 80–99.9)
METER GLUCOSE: 113 MG/DL (ref 74–99)
METER GLUCOSE: 166 MG/DL (ref 74–99)
PDW BLD-RTO: 15 FL (ref 11.5–15)
PLATELET # BLD: 263 E9/L (ref 130–450)
PMV BLD AUTO: 10.2 FL (ref 7–12)
POTASSIUM REFLEX MAGNESIUM: 4.2 MMOL/L (ref 3.5–5)
RBC # BLD: 3.73 E12/L (ref 3.5–5.5)
SODIUM BLD-SCNC: 136 MMOL/L (ref 132–146)
TRIGL SERPL-MCNC: 110 MG/DL (ref 0–149)
VLDLC SERPL CALC-MCNC: 22 MG/DL
WBC # BLD: 5 E9/L (ref 4.5–11.5)

## 2021-06-30 PROCEDURE — 96372 THER/PROPH/DIAG INJ SC/IM: CPT

## 2021-06-30 PROCEDURE — 36415 COLL VENOUS BLD VENIPUNCTURE: CPT

## 2021-06-30 PROCEDURE — 80061 LIPID PANEL: CPT

## 2021-06-30 PROCEDURE — G0378 HOSPITAL OBSERVATION PER HR: HCPCS

## 2021-06-30 PROCEDURE — 85027 COMPLETE CBC AUTOMATED: CPT

## 2021-06-30 PROCEDURE — 2580000003 HC RX 258: Performed by: INTERNAL MEDICINE

## 2021-06-30 PROCEDURE — 93010 ELECTROCARDIOGRAM REPORT: CPT | Performed by: INTERNAL MEDICINE

## 2021-06-30 PROCEDURE — 6360000002 HC RX W HCPCS: Performed by: INTERNAL MEDICINE

## 2021-06-30 PROCEDURE — 97161 PT EVAL LOW COMPLEX 20 MIN: CPT

## 2021-06-30 PROCEDURE — 80048 BASIC METABOLIC PNL TOTAL CA: CPT

## 2021-06-30 PROCEDURE — 99220 PR INITIAL OBSERVATION CARE/DAY 70 MINUTES: CPT | Performed by: PSYCHIATRY & NEUROLOGY

## 2021-06-30 PROCEDURE — 82962 GLUCOSE BLOOD TEST: CPT

## 2021-06-30 PROCEDURE — 70551 MRI BRAIN STEM W/O DYE: CPT

## 2021-06-30 PROCEDURE — 6370000000 HC RX 637 (ALT 250 FOR IP): Performed by: INTERNAL MEDICINE

## 2021-06-30 PROCEDURE — 83036 HEMOGLOBIN GLYCOSYLATED A1C: CPT

## 2021-06-30 RX ORDER — ATORVASTATIN CALCIUM 10 MG/1
10 TABLET, FILM COATED ORAL NIGHTLY
Qty: 30 TABLET | Refills: 3 | Status: SHIPPED | OUTPATIENT
Start: 2021-06-30

## 2021-06-30 RX ADMIN — PANTOPRAZOLE SODIUM 40 MG: 40 TABLET, DELAYED RELEASE ORAL at 05:38

## 2021-06-30 RX ADMIN — CALCIUM 500 MG: 500 TABLET ORAL at 07:59

## 2021-06-30 RX ADMIN — INSULIN LISPRO 1 UNITS: 100 INJECTION, SOLUTION INTRAVENOUS; SUBCUTANEOUS at 08:00

## 2021-06-30 RX ADMIN — ASPIRIN 81 MG: 81 TABLET, COATED ORAL at 07:59

## 2021-06-30 RX ADMIN — ENOXAPARIN SODIUM 40 MG: 40 INJECTION SUBCUTANEOUS at 08:00

## 2021-06-30 RX ADMIN — SODIUM CHLORIDE, PRESERVATIVE FREE 10 ML: 5 INJECTION INTRAVENOUS at 08:08

## 2021-06-30 ASSESSMENT — PAIN SCALES - GENERAL: PAINLEVEL_OUTOF10: 0

## 2021-06-30 NOTE — PROGRESS NOTES
Patient IV beeped patient wanted IV stopped and turned off for the night. Educated patient on need for fluids to be run, patient still refused. Offered to put a new IV in patient continued to refuse and said she would like it to be turned on in the morning.

## 2021-06-30 NOTE — DISCHARGE SUMMARY
Physician Discharge Summary     Patient ID:  Lai Madden  [id number]  69 y.o.  1950    Admit date: 6/29/2021    Discharge date and time:  6/30/2021    Discharge Diagnoses: Active Problems:    Vertigo    GERD (gastroesophageal reflux disease)    DM (diabetes mellitus), type 2 (HCC)    Transient elevated blood pressure    BPPV (benign paroxysmal positional vertigo) suspected  Resolved Problems:    * No resolved hospital problems.  *      Consults: IP CONSULT TO INTERNAL MEDICINE  IP CONSULT TO NEUROLOGY    Procedures: See below    Hospital Course:    59-year-old female history of diabetes admitted with strokelike symptoms- most consistent with BPPV     Admit Tele  Asa, Statin lifelong for primary prevention  MRI negative   CTA head neck with CT brain perfusion negative in ED  Neurology Consult appreciated     Medications for other co morbidities cont as appropriate w dosage adjustments as necessary   PT/OT  D/C planning Home-- 1mth LFTs    Discharge Exam:  See progress note from today    Condition:  Stable    Disposition: home    Patient Instructions:   Current Discharge Medication List      START taking these medications    Details   atorvastatin (LIPITOR) 10 MG tablet Take 1 tablet by mouth nightly  Qty: 30 tablet, Refills: 3         CONTINUE these medications which have NOT CHANGED    Details   omeprazole (PRILOSEC) 20 MG delayed release capsule Take 20 mg by mouth every morning (before breakfast)      cetirizine (ZYRTEC) 10 MG tablet Take 10 mg by mouth daily as needed for Allergies      aspirin 81 MG EC tablet Take 81 mg by mouth daily      calcium carbonate (OSCAL) 500 MG TABS tablet Take 500 mg by mouth daily      metFORMIN (GLUCOPHAGE) 1000 MG tablet Take 1,000 mg by mouth 2 times daily (with meals)      meclizine (ANTIVERT) 25 MG tablet Take 25 mg by mouth every 8 hours as needed for Dizziness            Activity: activity as tolerated  Diet: diabetic diet    Follow-up with 1wk PCP  Signed:  Parth Snyder Abhi Farias MD  6/30/2021  12:20 PM

## 2021-06-30 NOTE — PROGRESS NOTES
Occupational Therapy  OT order received and chart reviewed. Declines need of OT assessment reporting she is waiting to discharge. OT order discharged per pt request. Thank you.     Pamela Chew OTR/L  [id number]

## 2021-06-30 NOTE — H&P
smokeless tobacco. She reports that she does not drink alcohol and does not use drugs. Family History:   pt denies contributory history     REVIEW OF SYSTEMS:  As above in the HPI, otherwise negative    PHYSICAL EXAM:    Vitals:  /61   Pulse 87   Temp 98.2 °F (36.8 °C) (Oral)   Resp 16   Ht 4' 11\" (1.499 m)   Wt 120 lb (54.4 kg)   SpO2 93%   BMI 24.24 kg/m²     General:  Awake, alert, oriented X 3. Well developed, well nourished, well groomed. No apparent distress. HEENT:  Normocephalic, atraumatic. Pupils equal, round, reactive to light. No scleral icterus. No conjunctival injection. Normal lips, teeth, and gums. No nasal discharge. Neck:  Supple  Heart:  RRR, no murmurs, gallops, rubs  Lungs:  CTA bilaterally, bilat symmetrical expansion, no wheeze, rales, or rhonchi  Abdomen:   Bowel sounds present, soft, nontender, no masses, no organomegaly, no peritoneal signs  Extremities:  No clubbing, cyanosis, or edema  Skin:  Warm and dry, no open lesions or rash  Neuro:  Cranial nerves 2-12 intact, no focal deficits  Breast: deferred  Rectal: deferred  Genitalia:  deferred    LABS:    CBC with Differential:    Lab Results   Component Value Date    WBC 8.2 06/29/2021    RBC 4.24 06/29/2021    HGB 10.3 06/29/2021    HCT 33.1 06/29/2021     06/29/2021    MCV 78.1 06/29/2021    MCH 24.3 06/29/2021    MCHC 31.1 06/29/2021    RDW 14.9 06/29/2021    LYMPHOPCT 31.3 06/29/2021    MONOPCT 6.7 06/29/2021    BASOPCT 0.4 06/29/2021    MONOSABS 0.55 06/29/2021    LYMPHSABS 2.56 06/29/2021    EOSABS 0.10 06/29/2021    BASOSABS 0.03 06/29/2021     CMP:    Lab Results   Component Value Date     06/29/2021    K 4.2 06/29/2021    CL 97 06/29/2021    CO2 26 06/29/2021    BUN 8 06/29/2021    CREATININE 0.5 06/29/2021    GFRAA >60 06/29/2021    LABGLOM >60 06/29/2021    GLUCOSE 100 06/29/2021    PROT 8.6 06/29/2021    LABALBU 4.4 06/29/2021    CALCIUM 10.0 06/29/2021    BILITOT 0.3 06/29/2021    ALKPHOS 83 06/29/2021    AST 60 06/29/2021    ALT 49 06/29/2021     Magnesium:  No results found for: MG  Phosphorus:  No results found for: PHOS  PT/INR:    Lab Results   Component Value Date    PROTIME 11.4 06/29/2021    INR 1.1 06/29/2021     Last 3 Troponin:    Lab Results   Component Value Date    TROPONINI <0.01 12/24/2020     U/A:    Lab Results   Component Value Date    COLORU Yellow 06/29/2021    PROTEINU Negative 06/29/2021    PHUR 7.5 06/29/2021    WBCUA 2-5 12/24/2020    RBCUA 0-1 12/24/2020    BACTERIA MANY 12/24/2020    CLARITYU Clear 06/29/2021    SPECGRAV <=1.005 06/29/2021    LEUKOCYTESUR Negative 06/29/2021    UROBILINOGEN 0.2 06/29/2021    BILIRUBINUR Negative 06/29/2021    BLOODU Negative 06/29/2021    GLUCOSEU Negative 06/29/2021     ABG:  No results found for: PH, PCO2, PO2, HCO3, BE, THGB, TCO2, O2SAT  HgBA1c:  No results found for: LABA1C  FLP:  No results found for: TRIG, HDL, LDLCALC, LDLDIRECT, LABVLDL  TSH:  No results found for: TSH    XR CHEST PORTABLE   Final Result   No acute process. CTA HEAD W CONTRAST   Preliminary Result   No large vessel occlusion in the head or neck. No perfusion mismatch. CTA NECK W CONTRAST   Preliminary Result   No large vessel occlusion in the head or neck. No perfusion mismatch. CT BRAIN PERFUSION   Preliminary Result   No large vessel occlusion in the head or neck. No perfusion mismatch. CT HEAD WO CONTRAST   Final Result   No acute intracranial abnormality. Results were called by Dr. Roselia Myrick to Dr. Dudley Flores on 6/29/2021 at 14:46. MRI brain without contrast    (Results Pending)       ASSESSMENT:      Active Problems:    Ataxia    GERD (gastroesophageal reflux disease)    DM (diabetes mellitus), type 2 (HCC)    Transient elevated blood pressure  Resolved Problems:    * No resolved hospital problems.  *      PLAN:    78-year-old female history of diabetes admitted with strokelike symptoms    Admit Tele  Asa, Statin lifelong for primary prevention  MRI negative   CTA head neck with CT brain perfusion negative in ED  Neurology Consult appreciated    Medications for other co morbidities cont as appropriate w dosage adjustments as necessary   PT/OT  D/C planning Home            Electronically signed by Antonia Moss MD on 6/30/2021 at 6:58 AM

## 2021-06-30 NOTE — PROGRESS NOTES
Physical Therapy    Facility/Department: [address] INTERMEDIATE  Initial Assessment    NAME: Noralee Hodgkins  : 1950  MRN: [id number]    Date of Service: 2021       REQUIRES PT FOLLOW UP: Yes       Patient Diagnosis(es): The primary encounter diagnosis was Stroke-like symptom. Diagnoses of Ataxia and Essential hypertension were also pertinent to this visit. has a past medical history of Diabetes mellitus ([facility] [address] and Seizures ([facility] has a past surgical history that includes Hysterectomy; Cholecystectomy; Tonsillectomy; and Cosmetic surgery. Evaluating Therapist: Holley Sandhoff, PT     Referring Provider:  Dr. Jamison Montoya     PT order : PT eval and treat     Room #: 377   DIAGNOSIS:  Ataxia   PRECAUTIONS: falls     Social:  Pt lives alone  in a  1  floor plan  3  steps and no  rails to enter. Prior to admission pt walked with  No AD, independent      Initial Evaluation  Date:  2021  Treatment      Short Term/ Long Term   Goals   Was pt agreeable to Eval/treatment? yes      Does pt have pain?  back pain      Bed Mobility  Rolling:  NT   Supine to sit:  NT   Sit to supine:  NT   Scooting:  Independent in sit   Pt in chair upon arrival    independent    Transfers Sit to stand:  SBA/CGA   Stand to sit:  SBA   Stand pivot:  NT   Independent     Ambulation    150  feet with no AD CGA/min assist. 20 feet x 1 with ww SBA/CGA    200  feet with  AAD /no AD  with  Independent , no LOB        Stair negotiation: ascended and descended 2 steps with  1  rail with CGA/min assist    4  steps with  no rail with  SBA    LE ROM  WFL     LE strength  4- to 4/ 5      AM- PAC RAW score  18/ 24            Pt is alert and Oriented      Balance: SBA/CGA with use of ww. CGA/min assist at times without use of AD. Fall risk   Endurance: [facility]   Bed/Chair alarm: No - daughter present      ASSESSMENT  Pt displays functional ability as noted in the objective portion of this evaluation.         Conditions Requiring Skilled Therapeutic Intervention:    [x]Decreased strength     []Decreased ROM  [x]Decreased functional mobility  [x]Decreased balance   []Decreased endurance   []Decreased posture  []Decreased sensation  [x]Decreased coordination   []Decreased vision  [x]Decreased safety awareness   [x]Increased pain             Treatment/Education:     Mobility as above. Decreased accuracy noted with B LE ELISSA testing. Pt mildly unsteady with gait, occ bouts of stutter stepping noted with some ataxic movements . Fall risk. Pt reports she did not like use of ww. Daughter present for session   Pt reported dizziness with sit to stand . Pt educated on fall risk, PT POC        Patient response to education:   Pt verbalized understanding Pt demonstrated skill Pt requires further education in this area   x  with cues   x        Comments:  Pt left in chair after session, with call light in reach. Rehab potential is Good for reaching above PT goals. Pts/ family goals   1. None stated      Patient and or family understand(s) diagnosis, prognosis, and plan of care. - yes     PLAN  PT care will be provided in accordance with the objectives noted above. Whenever appropriate, clear delegation orders will be provided for nursing staff. Exercises and functional mobility practice will be used as well as appropriate assistive devices or modalities to obtain goals. Patient and family education will also be administered as needed.         PLAN OF CARE:    Current Treatment Recommendations     [x] Strengthening to improve independence with functional mobility   [] ROM to improve independence with functional mobility   [x] Balance Training to improve static/dynamic balance and to reduce fall risk  [x] Endurance Training to improve activity tolerance during functional mobility   [x] Transfer Training to improve safety and independence with all functional transfers   [x] Gait Training to improve gait mechanics, endurance and assess need for appropriate assistive device  [x] Stair Training in preparation for safe discharge home and/or into the community   [] Positioning to prevent skin breakdown and contractures  [x] Safety and Education Training   [x] Patient/Caregiver Education   [] HEP  [] Other     Frequency of treatments will be 2-5x/week x  7 days. Time in: 1135  Time out: 1148      Evaluation Time includes thorough review of current medical information, gathering information on past medical history/social history and prior level of function, completion of standardized testing/informal observation of tasks, assessment of data and education on plan of care and goals.     CPT codes:  [x] Low Complexity PT evaluation 31171  [] Moderate Complexity PT evaluation 38239  [] High Complexity PT evaluation 84467  [] PT Re-evaluation 66199  [] Gait training 33681  minutes  [] Therapeutic activities 61564  minutes  [] Therapeutic exercises 10922  minutes  [] Neuromuscular reeducation 13686  minutes       Sonia 18 number:  PT 6831

## 2021-06-30 NOTE — CONSULTS
NEUROLOGY CONSULT NOTE       Requesting Physician: Antonette Stewart MD     Reason for Consult:  Evaluate for CVA    History of Present Illness:  Sydney Hernandez is a 69 y.o. female admitted to [address] [place] [address] on 6/29/2021. She was preparing some food when she had sudden onset of vertigo. She has a past history of vertigo but this was more severe than what she is experienced in the past.  She thought maybe she needed to eat and she tried but that did not help so she went to sit down. Later called her daughter who had her come to her house and then decided she should go to the ER. No associated neurological symptoms other than possibly some left facial numbness which was transient. No visual changes. She did not notice that one side was weaker or less coordinated than the other. She states that her left side is always her bedside in general.  She does have some pain particularly when she is driving which radiates down the left posterior thigh. Has been having some right arm pain which is severe a few days ago but now is milder. Patient also denied any tinnitus or change in hearing with this so she has some chronic tinnitus. In the ER it was felt that she was little weak on the left with some drift, or possibly ataxia on the left. She was still dizzy in the emergency department.     Past Medical History:        Diagnosis Date    Diabetes mellitus ([address]     Seizures ([address]            Procedure Laterality Date    CHOLECYSTECTOMY      COSMETIC SURGERY      HYSTERECTOMY      TONSILLECTOMY         Social History:  Social History     Tobacco Use   Smoking Status Never Smoker   Smokeless Tobacco Never Used     Social History     Substance and Sexual Activity   Alcohol Use Never     Social History     Substance and Sexual Activity   Drug Use Never         Family History:   Positive for relatively young stroke    Review of Systems:  CONSTITUTIONAL:  negative for fever or recent illness  EYE: No recent visual changes  ENT: Old perforation left tympanic membrane; chronic tinnitus but no increase with this episode; no change in hearing  RESPIRATORY:  negative for dyspnea  CARDIOVASCULAR:  negative for chest pain  GASTROINTESTINAL: Some nausea with the episode but this was not the precipitating factor  HEMATOLOGIC/LYMPHATIC:  negative for unusual bleeding  MUSCULOSKELETAL: Left upper extremity pain for couple of days prior to admission  GENITOURINARY: negative for dysuria  NEUROLOGIC:  see HPI    Allergies:     Allergies   Allergen Reactions    Adhesive Tape Itching and Rash     PER PATIENT \"GETS WELTS THAT LAST A MONTH\"    Meperidine Hcl Palpitations and Nausea And Vomiting     palpitation, vomiting        Current Medications:   calcium elemental (OSCAL) tablet 500 mg, Daily  pantoprazole (PROTONIX) tablet 40 mg, QAM AC  sodium chloride flush 0.9 % injection 5-40 mL, 2 times per day  sodium chloride flush 0.9 % injection 5-40 mL, PRN  0.9 % sodium chloride infusion, PRN  ondansetron (ZOFRAN-ODT) disintegrating tablet 4 mg, Q8H PRN   Or  ondansetron (ZOFRAN) injection 4 mg, Q6H PRN  polyethylene glycol (GLYCOLAX) packet 17 g, Daily PRN  enoxaparin (LOVENOX) injection 40 mg, Daily  aspirin EC tablet 81 mg, Daily   Or  aspirin suppository 300 mg, Daily  atorvastatin (LIPITOR) tablet 40 mg, Nightly  insulin lispro (HUMALOG) injection vial 0-6 Units, TID WC  insulin lispro (HUMALOG) injection vial 0-3 Units, Nightly  glucose (GLUTOSE) 40 % oral gel 15 g, PRN  dextrose 50 % IV solution, PRN  glucagon (rDNA) injection 1 mg, PRN  dextrose 5 % solution, PRN  0.9 % sodium chloride infusion, Continuous      Medications Prior to Admission: omeprazole (PRILOSEC) 20 MG delayed release capsule, Take 20 mg by mouth every morning (before breakfast)  cetirizine (ZYRTEC) 10 MG tablet, Take 10 mg by mouth daily as needed for Allergies  aspirin 81 MG EC tablet, Take 81 mg by mouth daily  calcium carbonate (OSCAL) 500 MG TABS tablet, Take 500 mg by mouth daily  metFORMIN (GLUCOPHAGE) 1000 MG tablet, Take 1,000 mg by mouth 2 times daily (with meals)  meclizine (ANTIVERT) 25 MG tablet, Take 25 mg by mouth every 8 hours as needed for Dizziness     Physical Exam:  /60   Pulse 71   Temp 98 °F (36.7 °C) (Oral)   Resp 16   Ht 4' 11\" (1.499 m)   Wt 120 lb (54.4 kg)   SpO2 95%   BMI 24.24 kg/m²  I Body mass index is 24.24 kg/m². I   Wt Readings from Last 1 Encounters:   06/29/21 120 lb (54.4 kg)        GENERAL: she is in no apparent distress, and appears stated age   EYE:  Fundi not examined due to the Covid-19 outbreak  CARDIOVASCULAR:  Heart regular rate and rhythm. No carotid bruits detected. NEUROLOGIC:  Level of Alertness: alert  Orientation: oriented to person, place and time  Memory and Fund of Knowledge:  normal  Attention/Concentration: normal  Language: no aphasia  Cranial Nerves: pupils are equal; extraocular muscles intact; facial strength and sensation are intact; hearing is intact to finger rub; the palate raises midline, and the tongue protrudes midline; shoulder shrug is symmetric  Motor Exam: Normal tone in all extremities. No pronator drift. Strength is MRC grade 5 in all extremities bilaterally. Sensory: Sensory symmetric to light touch  Coordination: Cerebellar function is intact for the nose-finger-nose and heel-to-shin maneuvers, and for rapid alternating movements  Deep Tendon Reflexes:  Only trace where evoked  Abnormal movements: none  Station and gait: Slow and occasionally staggers a little    Diagnostics:  CBC:   Lab Results   Component Value Date    WBC 5.0 06/30/2021    RBC 3.73 06/30/2021    HGB 9.2 06/30/2021    HCT 29.8 06/30/2021    MCV 79.9 06/30/2021    MCH 24.7 06/30/2021    MCHC 30.9 06/30/2021    RDW 15.0 06/30/2021     06/30/2021    MPV 10.2 06/30/2021     CMP:    Lab Results   Component Value Date     06/30/2021    K 4.2 06/30/2021     06/30/2021    CO2 27 06/30/2021 BUN 9 06/30/2021    CREATININE 0.5 06/30/2021    GFRAA >60 06/30/2021    LABGLOM >60 06/30/2021    GLUCOSE 152 06/30/2021    PROT 8.6 06/29/2021    LABALBU 4.4 06/29/2021    CALCIUM 9.1 06/30/2021    BILITOT 0.3 06/29/2021    ALKPHOS 83 06/29/2021    AST 60 06/29/2021    ALT 49 06/29/2021     PT/INR:    Lab Results   Component Value Date    PROTIME 11.4 06/29/2021    INR 1.1 06/29/2021     LDL cholesterol 94    CT and MRI brain images reviewed: Unremarkable for age  CT angiography showed no large vessel stenosis      Impression:  As discussed, I suspect this is a vestibular problem, probably benign positional vertigo, but we cannot completely exclude a TIA or tiny stroke, particularly in the brainstem, not visualized by MRI      Recommendations :  Continue baby aspirin. In case this is a vascular problem would also add low-dose Lipitor, 10 mg nightly. If she is not discharged before they come we can have vestibular rehab try repositioning maneuver to see if we can help with any residual vertigo, though she seems to be doing much better now than yesterday. I appreciate the opportunity to participate in this pleasant patient's care.     Electronically signed by Breezy Gomez DO on 6/30/2021 at 10:16 AM

## 2021-09-15 ENCOUNTER — HOSPITAL ENCOUNTER (OUTPATIENT)
Age: 71
Discharge: HOME | End: 2021-09-17

## 2021-09-15 PROCEDURE — 88305 TISSUE EXAM BY PATHOLOGIST: CPT

## 2022-07-27 ENCOUNTER — APPOINTMENT (OUTPATIENT)
Dept: GENERAL RADIOLOGY | Age: 72
End: 2022-07-27
Payer: MEDICARE

## 2022-07-27 ENCOUNTER — APPOINTMENT (OUTPATIENT)
Dept: CT IMAGING | Age: 72
End: 2022-07-27
Payer: MEDICARE

## 2022-07-27 ENCOUNTER — HOSPITAL ENCOUNTER (EMERGENCY)
Age: 72
Discharge: HOME | End: 2022-07-27
Attending: EMERGENCY MEDICINE
Payer: MEDICARE

## 2022-07-27 VITALS
RESPIRATION RATE: 18 BRPM | SYSTOLIC BLOOD PRESSURE: 148 MMHG | HEART RATE: 80 BPM | TEMPERATURE: 98.1 F | OXYGEN SATURATION: 95 % | DIASTOLIC BLOOD PRESSURE: 72 MMHG
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
## 2022-07-27 PROCEDURE — 72125 CT NECK SPINE W/O DYE: CPT

## 2022-07-27 PROCEDURE — 73560 X-RAY EXAM OF KNEE 1 OR 2: CPT

## 2022-07-27 PROCEDURE — 73030 X-RAY EXAM OF SHOULDER: CPT

## 2022-07-27 PROCEDURE — 70450 CT HEAD/BRAIN W/O DYE: CPT

## 2022-07-27 PROCEDURE — 73060 X-RAY EXAM OF HUMERUS: CPT

## 2022-07-27 PROCEDURE — 99284 EMERGENCY DEPT VISIT MOD MDM: CPT

## 2022-07-27 RX ORDER — TETANUS AND DIPHTHERIA TOXOIDS ADSORBED 2; 2 [LF]/.5ML; [LF]/.5ML
0.5 INJECTION INTRAMUSCULAR ONCE
Status: DISCONTINUED | OUTPATIENT
Start: 2022-07-27 | End: 2022-07-27 | Stop reason: HOSPADM

## 2022-07-27 RX ORDER — GLYBURIDE 1.25 MG/1
TABLET ORAL
COMMUNITY

## 2022-07-27 RX ORDER — DULAGLUTIDE 1.5 MG/.5ML
INJECTION, SOLUTION SUBCUTANEOUS WEEKLY
COMMUNITY

## 2022-07-27 ASSESSMENT — ENCOUNTER SYMPTOMS
SINUS PRESSURE: 0
COUGH: 0
DIARRHEA: 0
EYE DISCHARGE: 0
SHORTNESS OF BREATH: 0
NAUSEA: 0
ABDOMINAL PAIN: 0
BACK PAIN: 0
SORE THROAT: 0
EYE PAIN: 0
WHEEZING: 0
VOMITING: 0

## 2022-07-27 ASSESSMENT — PAIN DESCRIPTION - ORIENTATION: ORIENTATION: RIGHT

## 2022-07-27 ASSESSMENT — PAIN - FUNCTIONAL ASSESSMENT: PAIN_FUNCTIONAL_ASSESSMENT: 0-10

## 2022-07-27 ASSESSMENT — PAIN SCALES - GENERAL: PAINLEVEL_OUTOF10: 7

## 2022-07-27 NOTE — ED NOTES
Patient refused tetanus shot and Dr Tami Zapata was notified     Nikolay Smith RN  07/27/22 [phone]

## 2022-07-27 NOTE — ED NOTES
Recheck of pt by Dr Mouna Brizuela and plan for discharge     Cristy Sherman RN  07/27/22 [phone]

## 2022-07-27 NOTE — DISCHARGE INSTRUCTIONS
Use Tylenol as needed for pain  Apply ice area and 20-minute intervals  Follow-up with primary care doctor  If you notice any new worrisome symptoms please return to emergency department for evaluation

## 2022-07-27 NOTE — ED PROVIDER NOTES
76-year-old female presenting emergency department for mechanical fall. Tripped on a box, fell, hit her head, right shoulder and right knee. Denies any loss of consciousness, is on aspirin but no other blood thinners. Had some transient nausea after the fall due to pain that is now resolved, denies any chest pain, chest tightness, shortness of breath, abdominal pain, states nothing feels weak, tingly, numb. Complaining of some shoulder pain with movement, sudden onset, worse with movement, improved by laying it still, associated with knee pain, mild to moderate in severity. Review of Systems   Constitutional:  Negative for chills and fever. HENT:  Negative for ear pain, sinus pressure and sore throat. Eyes:  Negative for pain and discharge. Respiratory:  Negative for cough, shortness of breath and wheezing. Cardiovascular:  Negative for chest pain. Gastrointestinal:  Negative for abdominal pain, diarrhea, nausea (Resolved) and vomiting. Genitourinary:  Negative for dysuria and frequency. Musculoskeletal:  Positive for arthralgias (Right knee, right shoulder). Negative for back pain. Skin:  Negative for rash and wound. Neurological:  Negative for weakness and headaches (Denies headache, says the right side of her head feels sore). Hematological:  Negative for adenopathy. All other systems reviewed and are negative. Physical Exam  Vitals and nursing note reviewed. Constitutional:       Appearance: Normal appearance. HENT:      Head: Normocephalic and atraumatic. Right Ear: External ear normal.      Left Ear: External ear normal.      Nose: Nose normal.      Mouth/Throat:      Mouth: Mucous membranes are moist.   Eyes:      Extraocular Movements: Extraocular movements intact. Pupils: Pupils are equal, round, and reactive to light. Cardiovascular:      Rate and Rhythm: Normal rate and regular rhythm. Pulses: Normal pulses.       Heart sounds: Normal heart sounds. Pulmonary:      Effort: Pulmonary effort is normal.      Breath sounds: Normal breath sounds. Abdominal:      General: Abdomen is flat. Bowel sounds are normal.      Palpations: Abdomen is soft. Musculoskeletal:         General: Normal range of motion. Cervical back: Normal range of motion and neck supple. Comments: Able to range shoulder, but states it hurts when she moves it   Skin:     General: Skin is warm and dry. Findings: Lesion (Superficial abrasion of right knee) present. Neurological:      General: No focal deficit present. Mental Status: She is alert and oriented to person, place, and time. Cranial Nerves: No cranial nerve deficit. Sensory: No sensory deficit. Motor: No weakness. Procedures     [facility]       ED Course as of 07/28/22 1306   Wed Jul 27, 2022   1328 Offered pain medication, she declined [JG]   1348 Patient refused tetanus shot [JG]   [phone] Patient signed out to me by outgoing attending. Plan for dispo pending CT scans. CT scans of the head and neck demonstrate no acute fractures. X-rays negative. Patient reevaluated notes improvement in pain. Patient able to ambulate. Find consistent musculoskeletal chest pain after fall. Plan of care discussed with patient and daughter at the bedside all questions were answered, decision made to discharge patient. Patient discharged in stable condition. [BP]      ED Course User Index  [BP] Alfonzo Noel DO  [JG] Kassandra Flores MD      69-year-old female presenting emergency department for mechanical fall, tripped over a box, striking her head and right shoulder, complaining of right knee pain and right shoulder pain. Patient not lose consciousness, she is not on any blood thinners. There is a small abrasion over the right knee, patient declined tetanus update, also declined pain medication. Imaging obtained of the CT head and cervical spine along with shoulder and right humerus, right knee. Imaging negative, degenerative changes noted, patient made aware exams and alignment, she was discharged, return cautions given, instructed follow-up with her care physician. She was able to ambulate emergency department. ED Course as of 07/28/22 1310   Wed Jul 27, 2022   1328 Offered pain medication, she declined [JG]   1348 Patient refused tetanus shot [JG]   [phone] Patient signed out to me by outgoing attending. Plan for dispo pending CT scans. CT scans of the head and neck demonstrate no acute fractures. X-rays negative. Patient reevaluated notes improvement in pain. Patient able to ambulate. Find consistent musculoskeletal chest pain after fall. Plan of care discussed with patient and daughter at the bedside all questions were answered, decision made to discharge patient. Patient discharged in stable condition. [BP]      ED Course User Index  [BP] Claudia Oviedo DO  [JG] Yessi Ann MD       --------------------------------------------- PAST HISTORY ---------------------------------------------  Past Medical History:  has a past medical history of Diabetes mellitus ([facility] and [facility] ([facility] Past Surgical History:  has a past surgical history that includes Hysterectomy; Cholecystectomy; Tonsillectomy; and Cosmetic surgery. Social History:  reports that she has never smoked. She has never used smokeless tobacco. She reports that she does not drink alcohol and does not use drugs. Family History: family history is not on file. The patients home medications have been reviewed. Allergies: Adhesive tape and Meperidine hcl    -------------------------------------------------- RESULTS -------------------------------------------------  Labs:  No results found for this visit on 07/27/22. Radiology:  CT Head WO Contrast   Final Result   No acute intracranial abnormality. CT Cervical Spine WO Contrast   Final Result   1. No acute fracture or subluxation.    2. Degenerative changes in the cervical spine without evidence of central   canal stenosis. XR KNEE RIGHT (1-2 VIEWS)   Final Result   No acute abnormality of the knee. XR SHOULDER RIGHT (MIN 2 VIEWS)   Final Result   No acute abnormality. XR HUMERUS RIGHT (MIN 2 VIEWS)   Final Result   No acute osseous abnormality.             ------------------------- NURSING NOTES AND VITALS REVIEWED ---------------------------  Date / Time Roomed:  7/27/2022  1:20 PM  ED Bed Assignment:  08/08    The nursing notes within the ED encounter and vital signs as below have been reviewed. BP (!) 148/72   Pulse 80   Temp 98.1 °F (36.7 °C) (Oral)   Resp 18   SpO2 95%   Oxygen Saturation Interpretation: Normal      ------------------------------------------ PROGRESS NOTES ------------------------------------------  1:10 PM EDT  I have spoken with the patient and discussed todays results, in addition to providing specific details for the plan of care and counseling regarding the diagnosis and prognosis. Their questions are answered at this time and they are agreeable with the plan. I discussed at length with them reasons for immediate return here for re evaluation. They will followup with their primary care physician by calling their office tomorrow. --------------------------------- ADDITIONAL PROVIDER NOTES ---------------------------------  At this time the patient is without objective evidence of an acute process requiring hospitalization or inpatient management. They have remained hemodynamically stable throughout their entire ED visit and are stable for discharge with outpatient follow-up. The plan has been discussed in detail and they are aware of the specific conditions for emergent return, as well as the importance of follow-up. Discharge Medication List as of 7/27/2022  5:17 PM          Diagnosis:  1.  Fall, initial encounter        Disposition:  Patient's disposition: Discharge to home  Patient's condition is stable.          Kassandra Flores MD  Resident  07/28/22 8227

## 2023-01-19 ENCOUNTER — APPOINTMENT (OUTPATIENT)
Dept: GENERAL RADIOLOGY | Age: 73
End: 2023-01-19
Payer: MEDICARE

## 2023-01-19 ENCOUNTER — HOSPITAL ENCOUNTER (EMERGENCY)
Age: 73
Discharge: HOME | End: 2023-01-19
Attending: EMERGENCY MEDICINE
Payer: MEDICARE

## 2023-01-19 VITALS
OXYGEN SATURATION: 93 % | TEMPERATURE: 98 F | WEIGHT: 120 LBS | DIASTOLIC BLOOD PRESSURE: 94 MMHG | SYSTOLIC BLOOD PRESSURE: 126 MMHG | RESPIRATION RATE: 16 BRPM | HEART RATE: 84 BPM | HEIGHT: 59 IN | BODY MASS INDEX: 24.19 KG/M2

## 2023-01-19 DIAGNOSIS — U07.1 COVID-19: ICD-10-CM

## 2023-01-19 DIAGNOSIS — N30.00 ACUTE CYSTITIS WITHOUT HEMATURIA: ICD-10-CM

## 2023-01-19 DIAGNOSIS — E86.0 DEHYDRATION, MILD: ICD-10-CM

## 2023-01-19 DIAGNOSIS — R11.2 NAUSEA AND VOMITING, UNSPECIFIED VOMITING TYPE: Primary | ICD-10-CM

## 2023-01-19 DIAGNOSIS — R42 LIGHTHEADEDNESS: ICD-10-CM

## 2023-01-19 DIAGNOSIS — R10.13 ABDOMINAL PAIN, EPIGASTRIC: ICD-10-CM

## 2023-01-19 LAB
ALBUMIN SERPL-MCNC: 3.9 G/DL (ref 3.5–5.2)
ALP BLD-CCNC: 99 U/L (ref 35–104)
ALT SERPL-CCNC: 121 U/L (ref 0–32)
ANION GAP SERPL CALCULATED.3IONS-SCNC: 12 MMOL/L (ref 7–16)
AST SERPL-CCNC: 96 U/L (ref 0–31)
BACTERIA: ABNORMAL /HPF
BASOPHILS ABSOLUTE: 0.02 E9/L (ref 0–0.2)
BASOPHILS RELATIVE PERCENT: 0.4 % (ref 0–2)
BILIRUB SERPL-MCNC: 0.6 MG/DL (ref 0–1.2)
BILIRUBIN URINE: NEGATIVE
BLOOD, URINE: NEGATIVE
BUN BLDV-MCNC: 7 MG/DL (ref 6–23)
CALCIUM SERPL-MCNC: 9.5 MG/DL (ref 8.6–10.2)
CHLORIDE BLD-SCNC: 96 MMOL/L (ref 98–107)
CLARITY: CLEAR
CO2: 25 MMOL/L (ref 22–29)
COLOR: YELLOW
CREAT SERPL-MCNC: 0.4 MG/DL (ref 0.5–1)
D DIMER: <200 NG/ML DDU
EOSINOPHILS ABSOLUTE: 0.05 E9/L (ref 0.05–0.5)
EOSINOPHILS RELATIVE PERCENT: 0.9 % (ref 0–6)
EPITHELIAL CELLS, UA: ABNORMAL /HPF
GFR SERPL CREATININE-BSD FRML MDRD: >60 ML/MIN/1.73
GLUCOSE BLD-MCNC: 137 MG/DL (ref 74–99)
GLUCOSE URINE: NEGATIVE MG/DL
HCT VFR BLD CALC: 42.9 % (ref 34–48)
HEMOGLOBIN: 14.6 G/DL (ref 11.5–15.5)
IMMATURE GRANULOCYTES #: 0.01 E9/L
IMMATURE GRANULOCYTES %: 0.2 % (ref 0–5)
INFLUENZA A BY PCR: NOT DETECTED
INFLUENZA B BY PCR: NOT DETECTED
KETONES, URINE: 15 MG/DL
LACTIC ACID: 1.7 MMOL/L (ref 0.5–2.2)
LEUKOCYTE ESTERASE, URINE: ABNORMAL
LIPASE: 15 U/L (ref 13–60)
LYMPHOCYTES ABSOLUTE: 1.58 E9/L (ref 1.5–4)
LYMPHOCYTES RELATIVE PERCENT: 29.1 % (ref 20–42)
MAGNESIUM: 1.8 MG/DL (ref 1.6–2.6)
MCH RBC QN AUTO: 29.3 PG (ref 26–35)
MCHC RBC AUTO-ENTMCNC: 34 % (ref 32–34.5)
MCV RBC AUTO: 86 FL (ref 80–99.9)
MONOCYTES ABSOLUTE: 0.49 E9/L (ref 0.1–0.95)
MONOCYTES RELATIVE PERCENT: 9 % (ref 2–12)
NEUTROPHILS ABSOLUTE: 3.28 E9/L (ref 1.8–7.3)
NEUTROPHILS RELATIVE PERCENT: 60.4 % (ref 43–80)
NITRITE, URINE: POSITIVE
PDW BLD-RTO: 17 FL (ref 11.5–15)
PH UA: 6 (ref 5–9)
PLATELET # BLD: 176 E9/L (ref 130–450)
PMV BLD AUTO: 10 FL (ref 7–12)
POTASSIUM SERPL-SCNC: 3.9 MMOL/L (ref 3.5–5)
PRO-BNP: 76 PG/ML (ref 0–125)
PROTEIN UA: NEGATIVE MG/DL
RBC # BLD: 4.99 E12/L (ref 3.5–5.5)
RBC UA: ABNORMAL /HPF (ref 0–2)
SARS-COV-2, NAAT: DETECTED
SODIUM BLD-SCNC: 133 MMOL/L (ref 132–146)
SPECIFIC GRAVITY UA: 1.01 (ref 1–1.03)
TOTAL PROTEIN: 7.8 G/DL (ref 6.4–8.3)
TROPONIN, HIGH SENSITIVITY: <6 NG/L (ref 0–9)
UROBILINOGEN, URINE: 1 E.U./DL
WBC # BLD: 5.4 E9/L (ref 4.5–11.5)
WBC UA: ABNORMAL /HPF (ref 0–5)

## 2023-01-19 PROCEDURE — 80053 COMPREHEN METABOLIC PANEL: CPT

## 2023-01-19 PROCEDURE — 83735 ASSAY OF MAGNESIUM: CPT

## 2023-01-19 PROCEDURE — 83880 ASSAY OF NATRIURETIC PEPTIDE: CPT

## 2023-01-19 PROCEDURE — 83605 ASSAY OF LACTIC ACID: CPT

## 2023-01-19 PROCEDURE — 96374 THER/PROPH/DIAG INJ IV PUSH: CPT

## 2023-01-19 PROCEDURE — 6360000002 HC RX W HCPCS: Performed by: STUDENT IN AN ORGANIZED HEALTH CARE EDUCATION/TRAINING PROGRAM

## 2023-01-19 PROCEDURE — 85025 COMPLETE CBC W/AUTO DIFF WBC: CPT

## 2023-01-19 PROCEDURE — 96361 HYDRATE IV INFUSION ADD-ON: CPT

## 2023-01-19 PROCEDURE — 87635 SARS-COV-2 COVID-19 AMP PRB: CPT

## 2023-01-19 PROCEDURE — 96375 TX/PRO/DX INJ NEW DRUG ADDON: CPT

## 2023-01-19 PROCEDURE — 87077 CULTURE AEROBIC IDENTIFY: CPT

## 2023-01-19 PROCEDURE — 87088 URINE BACTERIA CULTURE: CPT

## 2023-01-19 PROCEDURE — 87186 SC STD MICRODIL/AGAR DIL: CPT

## 2023-01-19 PROCEDURE — 87502 INFLUENZA DNA AMP PROBE: CPT

## 2023-01-19 PROCEDURE — 93005 ELECTROCARDIOGRAM TRACING: CPT | Performed by: STUDENT IN AN ORGANIZED HEALTH CARE EDUCATION/TRAINING PROGRAM

## 2023-01-19 PROCEDURE — 84484 ASSAY OF TROPONIN QUANT: CPT

## 2023-01-19 PROCEDURE — 81001 URINALYSIS AUTO W/SCOPE: CPT

## 2023-01-19 PROCEDURE — 85378 FIBRIN DEGRADE SEMIQUANT: CPT

## 2023-01-19 PROCEDURE — 2580000003 HC RX 258: Performed by: STUDENT IN AN ORGANIZED HEALTH CARE EDUCATION/TRAINING PROGRAM

## 2023-01-19 PROCEDURE — 83690 ASSAY OF LIPASE: CPT

## 2023-01-19 PROCEDURE — 99285 EMERGENCY DEPT VISIT HI MDM: CPT

## 2023-01-19 PROCEDURE — 71045 X-RAY EXAM CHEST 1 VIEW: CPT

## 2023-01-19 RX ORDER — CEFDINIR 300 MG/1
300 CAPSULE ORAL 2 TIMES DAILY
Qty: 14 CAPSULE | Refills: 0 | Status: SHIPPED | OUTPATIENT
Start: 2023-01-19 | End: 2023-01-26

## 2023-01-19 RX ORDER — HALOPERIDOL 5 MG/ML
2.5 INJECTION INTRAMUSCULAR ONCE
Status: DISCONTINUED | OUTPATIENT
Start: 2023-01-19 | End: 2023-01-19

## 2023-01-19 RX ORDER — DIPHENHYDRAMINE HYDROCHLORIDE 50 MG/ML
25 INJECTION INTRAMUSCULAR; INTRAVENOUS ONCE
Status: COMPLETED | OUTPATIENT
Start: 2023-01-19 | End: 2023-01-19

## 2023-01-19 RX ORDER — ONDANSETRON 4 MG/1
8 TABLET, ORALLY DISINTEGRATING ORAL 3 TIMES DAILY PRN
Qty: 30 TABLET | Refills: 0 | Status: SHIPPED | OUTPATIENT
Start: 2023-01-19 | End: 2023-01-26

## 2023-01-19 RX ORDER — METOCLOPRAMIDE HYDROCHLORIDE 5 MG/ML
10 INJECTION INTRAMUSCULAR; INTRAVENOUS ONCE
Status: COMPLETED | OUTPATIENT
Start: 2023-01-19 | End: 2023-01-19

## 2023-01-19 RX ORDER — DIPHENHYDRAMINE HYDROCHLORIDE 50 MG/ML
25 INJECTION INTRAMUSCULAR; INTRAVENOUS ONCE
Status: DISCONTINUED | OUTPATIENT
Start: 2023-01-19 | End: 2023-01-19

## 2023-01-19 RX ORDER — METOCLOPRAMIDE HYDROCHLORIDE 5 MG/ML
10 INJECTION INTRAMUSCULAR; INTRAVENOUS ONCE
Status: DISCONTINUED | OUTPATIENT
Start: 2023-01-19 | End: 2023-01-19

## 2023-01-19 RX ORDER — 0.9 % SODIUM CHLORIDE 0.9 %
1000 INTRAVENOUS SOLUTION INTRAVENOUS ONCE
Status: COMPLETED | OUTPATIENT
Start: 2023-01-19 | End: 2023-01-19

## 2023-01-19 RX ADMIN — SODIUM CHLORIDE 1000 ML: 9 INJECTION, SOLUTION INTRAVENOUS at 18:05

## 2023-01-19 RX ADMIN — SODIUM CHLORIDE 1000 ML: 9 INJECTION, SOLUTION INTRAVENOUS at 18:36

## 2023-01-19 RX ADMIN — WATER 2000 MG: 1 INJECTION INTRAMUSCULAR; INTRAVENOUS; SUBCUTANEOUS at 18:34

## 2023-01-19 RX ADMIN — METOCLOPRAMIDE 10 MG: 5 INJECTION, SOLUTION INTRAMUSCULAR; INTRAVENOUS at 20:05

## 2023-01-19 RX ADMIN — DIPHENHYDRAMINE HYDROCHLORIDE 25 MG: 50 INJECTION, SOLUTION INTRAMUSCULAR; INTRAVENOUS at 20:05

## 2023-01-19 ASSESSMENT — PAIN - FUNCTIONAL ASSESSMENT: PAIN_FUNCTIONAL_ASSESSMENT: NONE - DENIES PAIN

## 2023-01-19 NOTE — ED PROVIDER NOTES
Department of Emergency Medicine   ED Provider Note  Admit Date/RoomTime: 1/19/2023  5:17 PM  ED Room: 13/13          History of Present Illness:  1/19/23, Time: 5:20 PM ZAIRA Madden is a 67 y.o. female w/ hx of type II DM, HTN, presenting to the ED for nausea and vomiting and epigastric abdominal pain. Patient states that she was just in [place], flew back to [place]. yesterday. She states that while she was on the flight she started having nausea and vomiting, actually had episodes of syncope with the emesis while on the airplane. She did not want to go to the hospital in [place] or [facility] and therefore wait until she got home. She last had nonbloody/nonbilious emesis yesterday, some nausea today but no emesis. Patient received 4 mg of IV Zofran by EMS prior to arrival.    Patient has been having sharp epigastric abdominal pain associated with nausea, lightheadedness while laying and sitting, worse with standing. She has not been eating and drinking much due to the nausea. She has had dark urine, no dysuria or hematuria urgency or frequency. No diarrhea or constipation, adamantly denying any melena or hematochezia. No chest pain or shortness of breath. No fever, some chills. No known ill contacts, no reported unusual food intake. Has had previous cholecystectomy, denies heavy alcohol use. Patient was at PCP today and was sent to the emergency department for further evaluation. Additional history obtained from chart review, PCP note, patient's daughter. Review of Systems:     Pertinent positives and negatives are stated within HPI.      --------------------------------------------- PAST HISTORY ---------------------------------------------  Past Medical History:  has a past medical history of Diabetes mellitus ([facility] and Seizures ([facility] Past Surgical History:  has a past surgical history that includes Hysterectomy; Cholecystectomy;  Tonsillectomy; and Cosmetic surgery. Social History:  reports that she has never smoked. She has never used smokeless tobacco. She reports that she does not drink alcohol and does not use drugs. Family History: family history is not on file. The patients home medications have been reviewed. Allergies: Adhesive tape and Meperidine hcl      ---------------------------------------------------PHYSICAL EXAM--------------------------------------    Constitutional/General: Awake and alert, NAD, no labored breathing  Head: Normocephalic and atraumatic  Eyes: EOMI, conjunctiva normal, sclera non icteric  Ears: Normal external ears  Nose: Normal external nose  Mouth: Dry mucous membranes, uvula midline  Neck: Supple, no stridor, no meningeal signs  Respiratory: Lungs clear to auscultation bilaterally, no wheezes, rales, or rhonchi. Not in respiratory distress  Cardiovascular:  Regular rate. Regular rhythm. No murmurs, no gallops, or rubs. 2+ distal pulses. Equal extremity pulses. Chest: No chest wall tenderness or deformity  GI:  Abdomen Soft, significantly tender in epigastric region, negative Martinez sign, Non distended. No rebound, guarding, or rigidity. No pulsatile masses. Musculoskeletal: Moves all extremities x 4. Warm and well perfused, no clubbing, cyanosis, or edema. Capillary refill <3 seconds  Integument: skin warm and dry. Neurologic: GCS 15, no focal deficits, alert and responsive, symmetric strength 5/5 in the major muscle groups of upper and lower extremities bilaterally  Psychiatric: Normal Affect      -------------------------------------------------- RESULTS -------------------------------------------------  I have personally reviewed and interpreted all laboratory and imaging results for this patient. Results are listed below.      LABS:  Results for orders placed or performed during the hospital encounter of 01/19/23   RAPID INFLUENZA A/B ANTIGENS    Specimen: Nasopharyngeal   Result Value Ref Range    Influenza A by PCR Not Detected Not Detected    Influenza B by PCR Not Detected Not Detected   COVID-19, Rapid    Specimen: Nasopharyngeal Swab   Result Value Ref Range    SARS-CoV-2, NAAT DETECTED (A) Not Detected   Lipase   Result Value Ref Range    Lipase 15 13 - 60 U/L   Magnesium   Result Value Ref Range    Magnesium 1.8 1.6 - 2.6 mg/dL   CBC with Auto Differential   Result Value Ref Range    WBC 5.4 4.5 - 11.5 E9/L    RBC 4.99 3.50 - 5.50 E12/L    Hemoglobin 14.6 11.5 - 15.5 g/dL    Hematocrit 42.9 34.0 - 48.0 %    MCV 86.0 80.0 - 99.9 fL    MCH 29.3 26.0 - 35.0 pg    MCHC 34.0 32.0 - 34.5 %    RDW 17.0 (H) 11.5 - 15.0 fL    Platelets 176 130 - 450 E9/L    MPV 10.0 7.0 - 12.0 fL    Neutrophils % 60.4 43.0 - 80.0 %    Immature Granulocytes % 0.2 0.0 - 5.0 %    Lymphocytes % 29.1 20.0 - 42.0 %    Monocytes % 9.0 2.0 - 12.0 %    Eosinophils % 0.9 0.0 - 6.0 %    Basophils % 0.4 0.0 - 2.0 %    Neutrophils Absolute 3.28 1.80 - 7.30 E9/L    Immature Granulocytes # 0.01 E9/L    Lymphocytes Absolute 1.58 1.50 - 4.00 E9/L    Monocytes Absolute 0.49 0.10 - 0.95 E9/L    Eosinophils Absolute 0.05 0.05 - 0.50 E9/L    Basophils Absolute 0.02 0.00 - 0.20 E9/L   CMP   Result Value Ref Range    Sodium 133 132 - 146 mmol/L    Potassium 3.9 3.5 - 5.0 mmol/L    Chloride 96 (L) 98 - 107 mmol/L    CO2 25 22 - 29 mmol/L    Anion Gap 12 7 - 16 mmol/L    Glucose 137 (H) 74 - 99 mg/dL    BUN 7 6 - 23 mg/dL    Creatinine 0.4 (L) 0.5 - 1.0 mg/dL    Est, Glom Filt Rate >60 >=60 mL/min/1.73    Calcium 9.5 8.6 - 10.2 mg/dL    Total Protein 7.8 6.4 - 8.3 g/dL    Albumin 3.9 3.5 - 5.2 g/dL    Total Bilirubin 0.6 0.0 - 1.2 mg/dL    Alkaline Phosphatase 99 35 - 104 U/L     (H) 0 - 32 U/L    AST 96 (H) 0 - 31 U/L   Troponin   Result Value Ref Range    Troponin, High Sensitivity <6 0 - 9 ng/L   Urinalysis with Microscopic   Result Value Ref Range    Color, UA Yellow Straw/Yellow    Clarity, UA Clear Clear    Glucose, Ur Negative Negative mg/dL     Bilirubin Urine Negative Negative    Ketones, Urine 15 (A) Negative mg/dL    Specific Gravity, UA 1.010 1.005 - 1.030    Blood, Urine Negative Negative    pH, UA 6.0 5.0 - 9.0    Protein, UA Negative Negative mg/dL    Urobilinogen, Urine 1.0 <2.0 E.U./dL    Nitrite, Urine POSITIVE (A) Negative    Leukocyte Esterase, Urine TRACE (A) Negative    WBC, UA 2-5 0 - 5 /HPF    RBC, UA NONE 0 - 2 /HPF    Epithelial Cells, UA FEW /HPF    Bacteria, UA MANY (A) None Seen /HPF   Lactic Acid   Result Value Ref Range    Lactic Acid 1.7 0.5 - 2.2 mmol/L   Brain Natriuretic Peptide   Result Value Ref Range    Pro-BNP 76 0 - 125 pg/mL   D-Dimer, Quantitative   Result Value Ref Range    D-Dimer, Quant <200 ng/mL DDU   EKG 12 Lead   Result Value Ref Range    Ventricular Rate 68 BPM    Atrial Rate 68 BPM    P-R Interval 184 ms    QRS Duration 80 ms    Q-T Interval 444 ms    QTc Calculation (Bazett) 472 ms    P Axis 34 degrees    R Axis 19 degrees    T Axis 53 degrees       RADIOLOGY:  Imaging Interpreted by Radiologist, initial wet read of imaging interpreted by myself  XR CHEST PORTABLE   Final Result   No acute process. EKG:    See ED Course for EKG documentation        ------------------------- NURSING NOTES AND VITALS REVIEWED ---------------------------   The nursing notes within the ED encounter and vital signs as below have been reviewed by myself. BP (!) 126/94   Pulse 84   Temp 98 °F (36.7 °C) (Oral)   Resp 16   Ht 4' 11\" (1.499 m)   Wt 120 lb (54.4 kg)   SpO2 93%   BMI 24.24 kg/m²   Oxygen Saturation Interpretation: Normal    The patients available past medical records and past encounters were reviewed, see MDM for details.         ------------------------------ ED COURSE/MEDICAL DECISION MAKING----------------------  Medications   0.9 % sodium chloride bolus (0 mLs IntraVENous Stopped 1/19/23 1836)   cefTRIAXone (ROCEPHIN) 2,000 mg in sterile water 20 mL IV syringe (2,000 mg IntraVENous Given 1/19/23 1834)   0.9 % sodium chloride bolus (0 mLs IntraVENous Stopped 1/19/23 1957)   metoclopramide (REGLAN) injection 10 mg (10 mg IntraVENous Given 1/19/23 2005)   diphenhydrAMINE (BENADRYL) injection 25 mg (25 mg IntraVENous Given 1/19/23 2005)            Medical Decision Making:     ED Course as of 01/20/23 0019   Thu Jan 19, 2023   1746 EKG: This EKG is signed and interpreted by the EP. Time: 17:41  Rate: 68  Rhythm: Sinus  Interpretation: non-specific EKG  Comparison: stable as compared to patient's most recent EKG   [CF]   1753 EKG: This EKG is signed and interpreted by me. Rate: 68  Rhythm: Sinus  Interpretation: no acute changes and non-specific EKG, no acute ischemic change  Comparison: stable as compared to patient's most recent EKG in 2021   [RH]   1819 Nitrite, Urine(!): POSITIVE  Urinalysis concerning for acute cystitis, 2 g IV Rocephin ordered. Urine culture sent [RH]   1935 D-Dimer, Quant: <200  D-dimer less than 200 no further imaging for CTA of the chest will be completed [CF]   1936 Patient reevaluated, resting in bed comfortably. Patient's daughter at bedside. Updated patient and daughter on results of labs and imaging and very strict return precautions. Patient feels well at this time, but they are worried about not being able to get nausea medication tonight due to pharmacies being closed. Will give additional dose of nausea medication here to cover overnight.  [RH]      ED Course User Index  [CF] Lidia Catherine DO  [RH] [address] DO     This is a 67-year-old female presents emerged department for nausea vomiting, epigastric abdominal pain. Patient had illness onset while traveling. She is fully vaccinated for COVID-19, positive for COVID-19 here with no respiratory symptoms, but with nausea vomiting. Patient with sensation of nausea vomiting after antiemetics here in emergency department.   Patient was given 4 mg of IV Zofran prior to arrival, given 2 L IV normal saline here in the emergency department. Patient was also given additional 10 mg of Reglan as well as 25 mg of IV Benadryl and 2 g of IV Rocephin. Work-up overall unremarkable and reassuring, urinalysis with some ketones suggestive of mild dehydration as well as positive nitrite and leukocyte esterase, concerning for possible infection. Urine culture was sent, patient given Rocephin. Patient patient negative for influenza, no lactic acidosis or elevated lipase, electrolytes within normal limits with normal magnesium. Patient low risk Wells PE, D-dimer less than 200, reassuring. Troponin less than 6, reassuring, no acute ischemic changes on EKG. Chest x-ray without acute process. BNP not significantly elevated. Kidney function at baseline. Patient with mild elevation of transaminases, patient chronically has mild elevation of transaminases, with mildly elevated ALT, but with no hyperbilirubinemia or elevated lipase and she has had previous cholecystectomy. This is nonspecific, needs outpatient follow-up. Patient with no leukocytosis or significant anemia. Patient significantly improved with resolution of symptoms after treatment here in the emergency department. Patient will be given prescription for antiemetics as well as p.o. [address] for treatment of likely UTI. Patient with no hypoxia from COVID-19, no evidence of COVID-19 pneumonia on chest x-ray, no real respiratory symptoms and is fully vaccinated. Patient given strict return precautions and all questions were answered. Chronic Conditions affecting care:    has a past medical history of Diabetes mellitus ([facility] [facility] and Seizures ([facility] Diagnoses considered include (but not limited to): Pancreatitis, gastroenteritis, UTI, dehydration, acute kidney injury, electrolyte abnormality, acute cholecystitis, COVID-19, influenza, pulmonary embolism, ACS. See ED COURSE for additional MDM. Labs & imaging reviewed and interpreted, see RESULTS above. Re-Evaluations:           See ED Course above. This patient's ED course included: a personal history and physicial examination, re-evaluation prior to disposition, multiple bedside re-evaluations, IV medications, cardiac monitoring, and continuous pulse oximetry    This patient has remained hemodynamically stable during their ED course. Consultations:  See ED Course    Counseling: The emergency physician has spoken with the patient and daughter and discussed todays results, in addition to providing specific details for the plan of care and counseling regarding the diagnosis and prognosis. Questions are answered at this time and they are agreeable with the plan.       --------------------------------- IMPRESSION AND DISPOSITION ---------------------------------    IMPRESSION  1. Nausea and vomiting, unspecified vomiting type    2. COVID-19    3. Acute cystitis without hematuria    4. Abdominal pain, epigastric    5. Lightheadedness    6. Dehydration, mild        DISPOSITION  Disposition: Discharge to home  Patient condition is stable    NOTE: This report was transcribed using voice recognition software. Every effort was made to ensure accuracy; however, inadvertent computerized transcription errors may be present. Also please note that patient was seen and examined by attending physician. Plan of care and disposition discussed with attending physician and they were immediately available or present for all procedures performed.        -- Javon Cisneros D.O. PGY-3     Emergency Medicine      1/20/2023 12:19 AM         [address] DO  Resident  01/20/23 [id number]

## 2023-01-20 LAB
EKG ATRIAL RATE: 68 BPM
EKG P AXIS: 34 DEGREES
EKG P-R INTERVAL: 184 MS
EKG Q-T INTERVAL: 444 MS
EKG QRS DURATION: 80 MS
EKG QTC CALCULATION (BAZETT): 472 MS
EKG R AXIS: 19 DEGREES
EKG T AXIS: 53 DEGREES
EKG VENTRICULAR RATE: 68 BPM

## 2023-01-22 LAB
ORGANISM: ABNORMAL
URINE CULTURE, ROUTINE: ABNORMAL

## 2023-11-26 ENCOUNTER — HOSPITAL ENCOUNTER (EMERGENCY)
Age: 73
Discharge: HOME | End: 2023-11-26
Payer: MEDICARE

## 2023-11-26 ENCOUNTER — APPOINTMENT (OUTPATIENT)
Dept: CT IMAGING | Age: 73
End: 2023-11-26
Payer: MEDICARE

## 2023-11-26 VITALS
SYSTOLIC BLOOD PRESSURE: 137 MMHG | RESPIRATION RATE: 18 BRPM | DIASTOLIC BLOOD PRESSURE: 97 MMHG | HEART RATE: 92 BPM | TEMPERATURE: 97.2 F | OXYGEN SATURATION: 95 %

## 2023-11-26 DIAGNOSIS — M54.42 ACUTE LEFT-SIDED LOW BACK PAIN WITH LEFT-SIDED SCIATICA: Primary | ICD-10-CM

## 2023-11-26 PROCEDURE — 72131 CT LUMBAR SPINE W/O DYE: CPT

## 2023-11-26 PROCEDURE — 96372 THER/PROPH/DIAG INJ SC/IM: CPT

## 2023-11-26 PROCEDURE — 6360000002 HC RX W HCPCS: Performed by: PHYSICIAN ASSISTANT

## 2023-11-26 PROCEDURE — 99284 EMERGENCY DEPT VISIT MOD MDM: CPT

## 2023-11-26 PROCEDURE — 6370000000 HC RX 637 (ALT 250 FOR IP): Performed by: PHYSICIAN ASSISTANT

## 2023-11-26 RX ORDER — ONDANSETRON 4 MG/1
4 TABLET, ORALLY DISINTEGRATING ORAL ONCE
Status: DISCONTINUED | OUTPATIENT
Start: 2023-11-26 | End: 2023-11-26 | Stop reason: HOSPADM

## 2023-11-26 RX ORDER — KETOROLAC TROMETHAMINE 30 MG/ML
30 INJECTION, SOLUTION INTRAMUSCULAR; INTRAVENOUS ONCE
Status: COMPLETED | OUTPATIENT
Start: 2023-11-26 | End: 2023-11-26

## 2023-11-26 RX ORDER — DEXAMETHASONE SODIUM PHOSPHATE 10 MG/ML
8 INJECTION INTRAMUSCULAR; INTRAVENOUS ONCE
Status: COMPLETED | OUTPATIENT
Start: 2023-11-26 | End: 2023-11-26

## 2023-11-26 RX ORDER — OXYCODONE HYDROCHLORIDE AND ACETAMINOPHEN 5; 325 MG/1; MG/1
1 TABLET ORAL ONCE
Status: COMPLETED | OUTPATIENT
Start: 2023-11-26 | End: 2023-11-26

## 2023-11-26 RX ORDER — LIDOCAINE 50 MG/G
1 PATCH TOPICAL EVERY 24 HOURS
Qty: 10 PATCH | Refills: 0 | Status: SHIPPED | OUTPATIENT
Start: 2023-11-26 | End: 2023-12-06

## 2023-11-26 RX ORDER — OXYCODONE HYDROCHLORIDE AND ACETAMINOPHEN 5; 325 MG/1; MG/1
1 TABLET ORAL EVERY 6 HOURS PRN
Qty: 20 TABLET | Refills: 0 | Status: SHIPPED | OUTPATIENT
Start: 2023-11-26 | End: 2023-12-01

## 2023-11-26 RX ORDER — NAPROXEN 500 MG/1
500 TABLET ORAL 2 TIMES DAILY PRN
Qty: 20 TABLET | Refills: 0 | Status: SHIPPED | OUTPATIENT
Start: 2023-11-26 | End: 2023-12-06

## 2023-11-26 RX ADMIN — DEXAMETHASONE SODIUM PHOSPHATE 8 MG: 10 INJECTION INTRAMUSCULAR; INTRAVENOUS at 12:26

## 2023-11-26 RX ADMIN — KETOROLAC TROMETHAMINE 30 MG: 30 INJECTION, SOLUTION INTRAMUSCULAR; INTRAVENOUS at 12:30

## 2023-11-26 RX ADMIN — OXYCODONE AND ACETAMINOPHEN 1 TABLET: 5; 325 TABLET ORAL at 12:26

## 2023-11-26 ASSESSMENT — PAIN DESCRIPTION - DESCRIPTORS: DESCRIPTORS: SHARP

## 2023-11-26 ASSESSMENT — PAIN DESCRIPTION - ORIENTATION: ORIENTATION: LEFT

## 2023-11-26 ASSESSMENT — PAIN SCALES - GENERAL: PAINLEVEL_OUTOF10: 10

## 2023-11-26 ASSESSMENT — PAIN DESCRIPTION - LOCATION: LOCATION: LEG

## 2023-11-26 NOTE — ED PROVIDER NOTES
Independent [facility] Visit. Department of Emergency Medicine   ED  Encounter Note  Admit Date/RoomTime: 2023 10:42 AM  ED Room: PRANAY/PRANAY    NAME: Dewayne Dudley  : 1950  MRN: [id number]     Chief Complaint:  Back Pain (Radiates down left leg- started on thursday)    History of Present Illness        Dewayne Dudley is a 75 y.o. old female with a prior history of recurrent intermittent self limited episodes of low back pain, presents to the emergency department by private vehicle, for non-traumatic acute,  left sided lower lumbar spine pain with radiation down the back of the left lower extremity to the popliteal fossa, for 3 day(s) prior to arrival.  Patient states that she was experiencing back pain the week prior but her primary issue at this time is the pain in her leg. There has been no recent injury as it relates to today's visit. Since onset the symptoms have been remaining constant and is moderate-to-severe. She has associated signs & symptoms of nothing additional.   She denies any new weakness, tingling or paresthesias, recent back injection, recent spinal surgery, recent spinal/chiropractic manipulation, history of IVDA, fever, abdominal pain, bladder incontinence, bowel incontinence, bladder retention, bladder urgency, bowel urgency, saddle paresthesias, or sacral numbness. The pain is aggraveated by any movement and sitting and relieved by standing. ROS   Pertinent positives and negatives are stated within HPI, all other systems reviewed and are negative. Past Medical History:  has a past medical history of Diabetes mellitus ([address]) and Seizures ([address] Surgical History:  has a past surgical history that includes Hysterectomy; Cholecystectomy; Tonsillectomy; and Cosmetic surgery. Social History:  reports that she has never smoked. She has never used smokeless tobacco. She reports that she does not drink alcohol and does not use drugs. Family History: family history is not on file.

## 2024-04-30 ENCOUNTER — HOSPITAL ENCOUNTER (OUTPATIENT)
Dept: INTERVENTIONAL RADIOLOGY/VASCULAR | Age: 74
Discharge: HOME | End: 2024-05-02
Payer: MEDICARE

## 2024-04-30 DIAGNOSIS — I73.9 PERIPHERAL VASCULAR DISEASE, UNSPECIFIED (HCC): ICD-10-CM

## 2024-04-30 PROCEDURE — 93922 UPR/L XTREMITY ART 2 LEVELS: CPT | Performed by: SURGERY

## 2024-04-30 PROCEDURE — 93922 UPR/L XTREMITY ART 2 LEVELS: CPT
